# Patient Record
Sex: FEMALE | Race: WHITE | Employment: FULL TIME | URBAN - METROPOLITAN AREA
[De-identification: names, ages, dates, MRNs, and addresses within clinical notes are randomized per-mention and may not be internally consistent; named-entity substitution may affect disease eponyms.]

---

## 2017-06-19 RX ORDER — PANTOPRAZOLE SODIUM 40 MG/1
TABLET, DELAYED RELEASE ORAL
Qty: 30 TABLET | Refills: 0 | Status: SHIPPED | OUTPATIENT
Start: 2017-06-19 | End: 2017-06-22 | Stop reason: SDUPTHER

## 2017-06-19 RX ORDER — HYDROCHLOROTHIAZIDE 25 MG/1
25 TABLET ORAL DAILY
Qty: 30 TABLET | Refills: 0 | Status: SHIPPED | OUTPATIENT
Start: 2017-06-19 | End: 2017-06-22 | Stop reason: SDUPTHER

## 2017-06-22 ENCOUNTER — OFFICE VISIT (OUTPATIENT)
Dept: FAMILY MEDICINE CLINIC | Age: 42
End: 2017-06-22
Payer: COMMERCIAL

## 2017-06-22 VITALS
OXYGEN SATURATION: 98 % | WEIGHT: 148 LBS | TEMPERATURE: 98 F | SYSTOLIC BLOOD PRESSURE: 124 MMHG | BODY MASS INDEX: 26.22 KG/M2 | HEART RATE: 96 BPM | DIASTOLIC BLOOD PRESSURE: 82 MMHG | HEIGHT: 63 IN

## 2017-06-22 DIAGNOSIS — F41.9 ANXIETY: ICD-10-CM

## 2017-06-22 DIAGNOSIS — K21.9 GASTROESOPHAGEAL REFLUX DISEASE WITHOUT ESOPHAGITIS: Primary | ICD-10-CM

## 2017-06-22 DIAGNOSIS — I10 ESSENTIAL HYPERTENSION: ICD-10-CM

## 2017-06-22 LAB
CHOLESTEROL, TOTAL: 283 MG/DL
CHOLESTEROL/HDL RATIO: NORMAL
HDLC SERPL-MCNC: 48 MG/DL (ref 35–70)
LDL CHOLESTEROL CALCULATED: NORMAL MG/DL (ref 0–160)
TRIGL SERPL-MCNC: NORMAL MG/DL
VLDLC SERPL CALC-MCNC: NORMAL MG/DL

## 2017-06-22 PROCEDURE — 99214 OFFICE O/P EST MOD 30 MIN: CPT | Performed by: NURSE PRACTITIONER

## 2017-06-22 RX ORDER — ALPRAZOLAM 0.5 MG/1
0.5 TABLET ORAL 2 TIMES DAILY PRN
Qty: 28 TABLET | Refills: 0 | Status: SHIPPED | OUTPATIENT
Start: 2017-06-22 | End: 2017-07-22

## 2017-06-22 RX ORDER — HYDROCHLOROTHIAZIDE 25 MG/1
25 TABLET ORAL DAILY
Qty: 90 TABLET | Refills: 1 | Status: SHIPPED | OUTPATIENT
Start: 2017-06-22 | End: 2018-01-03 | Stop reason: SDUPTHER

## 2017-06-22 RX ORDER — PANTOPRAZOLE SODIUM 40 MG/1
TABLET, DELAYED RELEASE ORAL
Qty: 90 TABLET | Refills: 1 | Status: SHIPPED | OUTPATIENT
Start: 2017-06-22 | End: 2018-01-03 | Stop reason: SDUPTHER

## 2017-06-22 RX ORDER — SERTRALINE HYDROCHLORIDE 25 MG/1
25 TABLET, FILM COATED ORAL DAILY
Qty: 30 TABLET | Refills: 0 | Status: SHIPPED | OUTPATIENT
Start: 2017-06-22 | End: 2017-07-20 | Stop reason: SDUPTHER

## 2017-06-22 ASSESSMENT — PATIENT HEALTH QUESTIONNAIRE - PHQ9
1. LITTLE INTEREST OR PLEASURE IN DOING THINGS: 0
SUM OF ALL RESPONSES TO PHQ9 QUESTIONS 1 & 2: 0
2. FEELING DOWN, DEPRESSED OR HOPELESS: 0
SUM OF ALL RESPONSES TO PHQ QUESTIONS 1-9: 0

## 2017-06-22 ASSESSMENT — ENCOUNTER SYMPTOMS
NAUSEA: 0
SORE THROAT: 0
ABDOMINAL PAIN: 1
SHORTNESS OF BREATH: 0
HEARTBURN: 1
COUGH: 0

## 2017-07-20 ENCOUNTER — OFFICE VISIT (OUTPATIENT)
Dept: FAMILY MEDICINE CLINIC | Age: 42
End: 2017-07-20
Payer: COMMERCIAL

## 2017-07-20 VITALS
HEIGHT: 63 IN | HEART RATE: 92 BPM | WEIGHT: 141 LBS | DIASTOLIC BLOOD PRESSURE: 82 MMHG | BODY MASS INDEX: 24.98 KG/M2 | SYSTOLIC BLOOD PRESSURE: 122 MMHG | OXYGEN SATURATION: 97 % | TEMPERATURE: 98.1 F

## 2017-07-20 DIAGNOSIS — Z71.6 ENCOUNTER FOR SMOKING CESSATION COUNSELING: ICD-10-CM

## 2017-07-20 DIAGNOSIS — F41.9 ANXIETY: Primary | ICD-10-CM

## 2017-07-20 PROCEDURE — 99214 OFFICE O/P EST MOD 30 MIN: CPT | Performed by: NURSE PRACTITIONER

## 2017-07-20 RX ORDER — VARENICLINE TARTRATE 25 MG
KIT ORAL
Qty: 53 TABLET | Refills: 0 | Status: SHIPPED | OUTPATIENT
Start: 2017-07-20 | End: 2017-08-24 | Stop reason: DRUGHIGH

## 2017-07-20 ASSESSMENT — ENCOUNTER SYMPTOMS
HEARTBURN: 0
NAUSEA: 0
VOMITING: 0
ABDOMINAL PAIN: 0

## 2017-08-24 ENCOUNTER — OFFICE VISIT (OUTPATIENT)
Dept: FAMILY MEDICINE CLINIC | Age: 42
End: 2017-08-24
Payer: COMMERCIAL

## 2017-08-24 VITALS
SYSTOLIC BLOOD PRESSURE: 114 MMHG | WEIGHT: 147 LBS | OXYGEN SATURATION: 98 % | HEART RATE: 84 BPM | TEMPERATURE: 98.2 F | BODY MASS INDEX: 26.04 KG/M2 | DIASTOLIC BLOOD PRESSURE: 60 MMHG

## 2017-08-24 DIAGNOSIS — G56.01 CARPAL TUNNEL SYNDROME OF RIGHT WRIST: ICD-10-CM

## 2017-08-24 DIAGNOSIS — Z71.6 ENCOUNTER FOR SMOKING CESSATION COUNSELING: Primary | ICD-10-CM

## 2017-08-24 PROCEDURE — 99407 BEHAV CHNG SMOKING > 10 MIN: CPT | Performed by: NURSE PRACTITIONER

## 2017-08-24 RX ORDER — VARENICLINE TARTRATE 1 MG/1
1 TABLET, FILM COATED ORAL 2 TIMES DAILY
Qty: 60 TABLET | Refills: 0 | Status: SHIPPED | OUTPATIENT
Start: 2017-08-24 | End: 2018-01-03 | Stop reason: ALTCHOICE

## 2017-08-24 ASSESSMENT — ENCOUNTER SYMPTOMS
COUGH: 0
SHORTNESS OF BREATH: 0

## 2018-01-03 ENCOUNTER — OFFICE VISIT (OUTPATIENT)
Dept: FAMILY MEDICINE CLINIC | Age: 43
End: 2018-01-03
Payer: COMMERCIAL

## 2018-01-03 ENCOUNTER — HOSPITAL ENCOUNTER (OUTPATIENT)
Dept: MAMMOGRAPHY | Age: 43
Discharge: HOME | End: 2018-01-03
Payer: COMMERCIAL

## 2018-01-03 VITALS
HEART RATE: 96 BPM | WEIGHT: 157 LBS | HEIGHT: 63 IN | DIASTOLIC BLOOD PRESSURE: 78 MMHG | BODY MASS INDEX: 27.82 KG/M2 | SYSTOLIC BLOOD PRESSURE: 136 MMHG | OXYGEN SATURATION: 98 % | TEMPERATURE: 98.2 F

## 2018-01-03 DIAGNOSIS — F41.1 GAD (GENERALIZED ANXIETY DISORDER): Primary | ICD-10-CM

## 2018-01-03 DIAGNOSIS — K21.9 GASTROESOPHAGEAL REFLUX DISEASE WITHOUT ESOPHAGITIS: ICD-10-CM

## 2018-01-03 DIAGNOSIS — Z12.31 SCREENING MAMMOGRAM, ENCOUNTER FOR: ICD-10-CM

## 2018-01-03 DIAGNOSIS — I10 ESSENTIAL HYPERTENSION: ICD-10-CM

## 2018-01-03 PROCEDURE — 99214 OFFICE O/P EST MOD 30 MIN: CPT | Performed by: NURSE PRACTITIONER

## 2018-01-03 PROCEDURE — 77067 SCR MAMMO BI INCL CAD: CPT

## 2018-01-03 RX ORDER — HYDROCHLOROTHIAZIDE 25 MG/1
25 TABLET ORAL DAILY
Qty: 90 TABLET | Refills: 1 | Status: SHIPPED | OUTPATIENT
Start: 2018-01-03 | End: 2018-07-19 | Stop reason: SDUPTHER

## 2018-01-03 RX ORDER — PANTOPRAZOLE SODIUM 40 MG/1
TABLET, DELAYED RELEASE ORAL
Qty: 90 TABLET | Refills: 1 | Status: SHIPPED | OUTPATIENT
Start: 2018-01-03 | End: 2018-07-19 | Stop reason: SDUPTHER

## 2018-01-03 NOTE — PROGRESS NOTES
Pulmonary/Chest: Effort normal and breath sounds normal. No respiratory distress. Neurological: She is alert and oriented to person, place, and time. Skin: Skin is warm and dry. Psychiatric: She has a normal mood and affect. Her speech is normal and behavior is normal. Judgment normal. Cognition and memory are normal. She expresses no homicidal and no suicidal ideation. She expresses no suicidal plans and no homicidal plans. Nursing note and vitals reviewed. Data:     Lab Results   Component Value Date     07/12/2013    K 4.2 07/12/2013     07/12/2013    CO2 22 07/12/2013    BUN 11 07/12/2013    CREATININE 0.77 07/12/2013    GLUCOSE 92 07/12/2013    GLUCOSE 111 04/26/2012    PROT 7.1 07/12/2013    LABALBU 4.5 07/12/2013    LABALBU 4.2 04/19/2012    BILITOT 0.41 07/12/2013    ALKPHOS 73 07/12/2013    AST 18 07/12/2013    ALT 12 07/12/2013     Lab Results   Component Value Date    WBC 7.7 02/24/2016    RBC 4.37 02/24/2016    RBC 4.47 04/26/2012    HGB 13.5 02/24/2016    HCT 40.2 02/24/2016    MCV 91.8 02/24/2016    MCH 30.9 02/24/2016    MCHC 33.6 02/24/2016    RDW 13.5 02/24/2016     02/24/2016     04/26/2012    MPV NOT REPORTED 02/24/2016     Lab Results   Component Value Date    TSH 1.26 02/24/2016     Lab Results   Component Value Date    CHOL 283 06/22/2017    HDL 48 06/22/2017    LABA1C 5.6 02/24/2016          Assessment & Plan       1. NALINI (generalized anxiety disorder)  ---Patient being well controlled on Zoloft daily. Patient denies suicidal ideation or plan. Continue taking Zoloft as directed. 2. Essential hypertension  --Blood pressure to goal at this time on hydrochlorothiazide. Continue current regimen     3. Gastroesophageal reflux disease without esophagitis   --Patient having no symptoms of gastric reflux at this time while taking pantoprazole. Continue taking medication as directed. Patient verbalizes understanding and agreement with plan. All questions answered. If symptoms do not resolve or worsen, return to office. Face to face time > 25 minutes    Greater than 50% of time was spent counseling pt regarding disease process and medications                Completed Refills   Requested Prescriptions     Signed Prescriptions Disp Refills    hydrochlorothiazide (HYDRODIURIL) 25 MG tablet 90 tablet 1     Sig: Take 1 tablet by mouth daily    pantoprazole (PROTONIX) 40 MG tablet 90 tablet 1     Sig: Take 1 tab first thing in AM on empty stomach    sertraline (ZOLOFT) 50 MG tablet 90 tablet 1     Sig: Take 1 tablet by mouth daily     No Follow-up on file. Orders Placed This Encounter   Medications    hydrochlorothiazide (HYDRODIURIL) 25 MG tablet     Sig: Take 1 tablet by mouth daily     Dispense:  90 tablet     Refill:  1    pantoprazole (PROTONIX) 40 MG tablet     Sig: Take 1 tab first thing in AM on empty stomach     Dispense:  90 tablet     Refill:  1    sertraline (ZOLOFT) 50 MG tablet     Sig: Take 1 tablet by mouth daily     Dispense:  90 tablet     Refill:  1     No orders of the defined types were placed in this encounter. Patient Instructions     SURVEY:    You may be receiving a survey from [employer] regarding your visit today. Please complete the survey to enable us to provide the highest quality of care to you and your family. If you cannot score us a very good on any question, please call the office to discuss how we could of made your experience a very good one. Thank you.       Electronically signed by Ger Montemayor CNP on 1/4/2018 at 10:42 AM           Completed Refills   Requested Prescriptions     Signed Prescriptions Disp Refills    hydrochlorothiazide (HYDRODIURIL) 25 MG tablet 90 tablet 1     Sig: Take 1 tablet by mouth daily    pantoprazole (PROTONIX) 40 MG tablet 90 tablet 1     Sig: Take 1 tab first thing in AM on empty stomach    sertraline (ZOLOFT) 50 MG tablet 90 tablet 1     Sig: Take 1 tablet by mouth daily           Katharine received counseling on the following healthy behaviors: nutrition, exercise and medication adherence  Reviewed prior labs and health maintenance  Continue current medications, diet and exercise. Discussed use, benefit, and side effects of prescribed medications. Barriers to medication compliance addressed. Patient given educational materials - see patient instructions  Was a self-tracking handout given in paper form or via [employer]? Yes    Requested Prescriptions     Signed Prescriptions Disp Refills    hydrochlorothiazide (HYDRODIURIL) 25 MG tablet 90 tablet 1     Sig: Take 1 tablet by mouth daily    pantoprazole (PROTONIX) 40 MG tablet 90 tablet 1     Sig: Take 1 tab first thing in AM on empty stomach    sertraline (ZOLOFT) 50 MG tablet 90 tablet 1     Sig: Take 1 tablet by mouth daily       All patient questions answered. Patient voiced understanding. Quality Measures    Body mass index is 27.81 kg/m². Elevated. Weight control planned discussed Healthy diet and regular exercise. BP: 136/78 Blood pressure is normal. Treatment plan consists of Weight Reduction and Increased Physical Activity.     No results found for: LDLCALC, LDLCHOLESTEROL, LDLDIRECT (goal LDL reduction with dx if diabetes is 50% LDL reduction)      PHQ Scores 6/22/2017   PHQ2 Score 0   PHQ9 Score 0     Interpretation of Total Score Depression Severity: 1-4 = Minimal depression, 5-9 = Mild depression, 10-14 = Moderate depression, 15-19 = Moderately severe depression, 20-27 = Severe depression

## 2018-01-04 ASSESSMENT — ENCOUNTER SYMPTOMS
VOMITING: 0
NAUSEA: 0
BLOOD IN STOOL: 0
ABDOMINAL PAIN: 0
SHORTNESS OF BREATH: 0
HEARTBURN: 0
COUGH: 0
DIARRHEA: 0
BELCHING: 0

## 2018-01-16 ENCOUNTER — HOSPITAL ENCOUNTER (OUTPATIENT)
Dept: ULTRASOUND IMAGING | Age: 43
Discharge: HOME | End: 2018-01-16
Payer: COMMERCIAL

## 2018-01-16 ENCOUNTER — HOSPITAL ENCOUNTER (OUTPATIENT)
Dept: MAMMOGRAPHY | Age: 43
Discharge: HOME | End: 2018-01-16
Payer: COMMERCIAL

## 2018-01-16 DIAGNOSIS — R92.8 ABNORMAL MAMMOGRAM: ICD-10-CM

## 2018-01-16 PROCEDURE — 77065 DX MAMMO INCL CAD UNI: CPT

## 2018-01-16 PROCEDURE — 76642 ULTRASOUND BREAST LIMITED: CPT

## 2018-05-16 ENCOUNTER — OFFICE VISIT (OUTPATIENT)
Dept: FAMILY MEDICINE CLINIC | Age: 43
End: 2018-05-16
Payer: COMMERCIAL

## 2018-05-16 VITALS
HEIGHT: 63 IN | RESPIRATION RATE: 18 BRPM | SYSTOLIC BLOOD PRESSURE: 118 MMHG | HEART RATE: 68 BPM | BODY MASS INDEX: 28.88 KG/M2 | DIASTOLIC BLOOD PRESSURE: 80 MMHG | WEIGHT: 163 LBS

## 2018-05-16 DIAGNOSIS — K21.9 GASTROESOPHAGEAL REFLUX DISEASE WITHOUT ESOPHAGITIS: ICD-10-CM

## 2018-05-16 DIAGNOSIS — E66.3 OVERWEIGHT: ICD-10-CM

## 2018-05-16 DIAGNOSIS — E04.9 ENLARGED THYROID: ICD-10-CM

## 2018-05-16 DIAGNOSIS — Z87.11 HISTORY OF BLEEDING ULCERS: ICD-10-CM

## 2018-05-16 DIAGNOSIS — E78.00 HYPERCHOLESTEROLEMIA: Primary | ICD-10-CM

## 2018-05-16 PROCEDURE — 99214 OFFICE O/P EST MOD 30 MIN: CPT | Performed by: NURSE PRACTITIONER

## 2018-05-16 ASSESSMENT — ENCOUNTER SYMPTOMS
EYES NEGATIVE: 1
COUGH: 0
ABDOMINAL DISTENTION: 0
SINUS PAIN: 0

## 2018-05-17 LAB
ALT SERPL-CCNC: 11 U/L
AST SERPL-CCNC: 14 U/L
BASOPHILS ABSOLUTE: ABNORMAL /ΜL
BASOPHILS RELATIVE PERCENT: ABNORMAL %
BUN BLDV-MCNC: 12 MG/DL
CALCIUM SERPL-MCNC: 10.1 MG/DL
CHLORIDE BLD-SCNC: 99 MMOL/L
CHOLESTEROL, TOTAL: 297 MG/DL
CHOLESTEROL/HDL RATIO: 6.6
CO2: 26 MMOL/L
CREAT SERPL-MCNC: 0.79 MG/DL
EOSINOPHILS ABSOLUTE: ABNORMAL /ΜL
EOSINOPHILS RELATIVE PERCENT: ABNORMAL %
GFR CALCULATED: NORMAL
GLUCOSE BLD-MCNC: 99 MG/DL
HCT VFR BLD CALC: 35.6 % (ref 36–46)
HDLC SERPL-MCNC: 45 MG/DL (ref 35–70)
HEMOGLOBIN: 11.9 G/DL (ref 12–16)
LDL CHOLESTEROL CALCULATED: 205 MG/DL (ref 0–160)
LYMPHOCYTES ABSOLUTE: ABNORMAL /ΜL
LYMPHOCYTES RELATIVE PERCENT: ABNORMAL %
MCH RBC QN AUTO: 29.4 PG
MCHC RBC AUTO-ENTMCNC: 33.4 G/DL
MCV RBC AUTO: 88 FL
MONOCYTES ABSOLUTE: ABNORMAL /ΜL
MONOCYTES RELATIVE PERCENT: ABNORMAL %
NEUTROPHILS ABSOLUTE: ABNORMAL /ΜL
NEUTROPHILS RELATIVE PERCENT: ABNORMAL %
PLATELET # BLD: 545 K/ΜL
PMV BLD AUTO: ABNORMAL FL
POTASSIUM SERPL-SCNC: 4.3 MMOL/L
RBC # BLD: 4.05 10^6/ΜL
SODIUM BLD-SCNC: 138 MMOL/L
TRIGL SERPL-MCNC: 236 MG/DL
TSH SERPL DL<=0.05 MIU/L-ACNC: 2.56 UIU/ML
VLDLC SERPL CALC-MCNC: ABNORMAL MG/DL
WBC # BLD: 7.7 10^3/ML

## 2018-05-18 ENCOUNTER — TELEPHONE (OUTPATIENT)
Dept: FAMILY MEDICINE CLINIC | Age: 43
End: 2018-05-18

## 2018-06-04 ENCOUNTER — TELEPHONE (OUTPATIENT)
Dept: FAMILY MEDICINE CLINIC | Age: 43
End: 2018-06-04

## 2018-06-04 DIAGNOSIS — Z87.11 HISTORY OF BLEEDING ULCERS: ICD-10-CM

## 2018-06-04 DIAGNOSIS — D50.9 IRON DEFICIENCY ANEMIA, UNSPECIFIED IRON DEFICIENCY ANEMIA TYPE: ICD-10-CM

## 2018-06-04 DIAGNOSIS — K21.9 GASTROESOPHAGEAL REFLUX DISEASE WITHOUT ESOPHAGITIS: Primary | ICD-10-CM

## 2018-06-06 ENCOUNTER — HOSPITAL ENCOUNTER (OUTPATIENT)
Age: 43
Discharge: HOME | End: 2018-06-06
Payer: COMMERCIAL

## 2018-06-06 DIAGNOSIS — K21.9 GASTROESOPHAGEAL REFLUX DISEASE WITHOUT ESOPHAGITIS: ICD-10-CM

## 2018-06-06 DIAGNOSIS — D50.9 IRON DEFICIENCY ANEMIA, UNSPECIFIED IRON DEFICIENCY ANEMIA TYPE: ICD-10-CM

## 2018-06-06 DIAGNOSIS — Z87.11 HISTORY OF BLEEDING ULCERS: ICD-10-CM

## 2018-06-06 LAB
ABSOLUTE EOS #: 0.1 K/UL (ref 0–0.4)
ABSOLUTE IMMATURE GRANULOCYTE: ABNORMAL K/UL (ref 0–0.3)
ABSOLUTE LYMPH #: 2.5 K/UL (ref 1–4.8)
ABSOLUTE MONO #: 0.4 K/UL (ref 0–1)
BASOPHILS # BLD: 1 % (ref 0–2)
BASOPHILS ABSOLUTE: 0.1 K/UL (ref 0–0.2)
DIFFERENTIAL TYPE: YES
EOSINOPHILS RELATIVE PERCENT: 1 % (ref 0–5)
FERRITIN: 5 UG/L (ref 13–150)
FOLATE: 13 NG/ML
HCT VFR BLD CALC: 31.6 % (ref 36–46)
HEMOGLOBIN: 10.6 G/DL (ref 12–16)
IMMATURE GRANULOCYTES: ABNORMAL %
IRON SATURATION: 7 % (ref 20–55)
IRON: 27 UG/DL (ref 37–145)
LYMPHOCYTES # BLD: 28 % (ref 15–40)
MCH RBC QN AUTO: 29 PG (ref 26–34)
MCHC RBC AUTO-ENTMCNC: 33.5 G/DL (ref 31–37)
MCV RBC AUTO: 86.7 FL (ref 80–100)
MONOCYTES # BLD: 5 % (ref 4–8)
NRBC AUTOMATED: ABNORMAL PER 100 WBC
PDW BLD-RTO: 14.9 % (ref 12.1–15.2)
PLATELET # BLD: 490 K/UL (ref 140–450)
PLATELET ESTIMATE: ABNORMAL
PMV BLD AUTO: ABNORMAL FL (ref 6–12)
RBC # BLD: 3.65 M/UL (ref 4–5.2)
RBC # BLD: ABNORMAL 10*6/UL
SEG NEUTROPHILS: 65 % (ref 47–75)
SEGMENTED NEUTROPHILS ABSOLUTE COUNT: 5.8 K/UL (ref 2.5–7)
TOTAL IRON BINDING CAPACITY: 383 UG/DL (ref 250–450)
UNSATURATED IRON BINDING CAPACITY: 356 UG/DL (ref 112–347)
VITAMIN B-12: 266 PG/ML (ref 232–1245)
WBC # BLD: 8.9 K/UL (ref 3.5–11)
WBC # BLD: ABNORMAL 10*3/UL

## 2018-06-06 PROCEDURE — 82607 VITAMIN B-12: CPT

## 2018-06-06 PROCEDURE — 83540 ASSAY OF IRON: CPT

## 2018-06-06 PROCEDURE — 82728 ASSAY OF FERRITIN: CPT

## 2018-06-06 PROCEDURE — 36415 COLL VENOUS BLD VENIPUNCTURE: CPT

## 2018-06-06 PROCEDURE — 82746 ASSAY OF FOLIC ACID SERUM: CPT

## 2018-06-06 PROCEDURE — 83550 IRON BINDING TEST: CPT

## 2018-06-06 PROCEDURE — 85025 COMPLETE CBC W/AUTO DIFF WBC: CPT

## 2018-06-07 RX ORDER — NORETHINDRONE ACETATE AND ETHINYL ESTRADIOL, ETHINYL ESTRADIOL AND FERROUS FUMARATE 1MG-10(24)
KIT ORAL
Refills: 4 | COMMUNITY
Start: 2018-05-31 | End: 2018-06-26

## 2018-06-26 ENCOUNTER — OFFICE VISIT (OUTPATIENT)
Dept: SURGERY | Age: 43
End: 2018-06-26
Payer: COMMERCIAL

## 2018-06-26 VITALS
HEART RATE: 88 BPM | WEIGHT: 161 LBS | HEIGHT: 63 IN | SYSTOLIC BLOOD PRESSURE: 123 MMHG | DIASTOLIC BLOOD PRESSURE: 81 MMHG | RESPIRATION RATE: 16 BRPM | BODY MASS INDEX: 28.53 KG/M2

## 2018-06-26 DIAGNOSIS — R10.13 EPIGASTRIC PAIN: Primary | ICD-10-CM

## 2018-06-26 DIAGNOSIS — Z87.11 HISTORY OF GASTRIC ULCER: ICD-10-CM

## 2018-06-26 PROBLEM — R92.8 ABNORMAL FINDING ON BREAST IMAGING: Status: ACTIVE | Noted: 2018-01-16

## 2018-06-26 PROCEDURE — 99203 OFFICE O/P NEW LOW 30 MIN: CPT | Performed by: SURGERY

## 2018-06-26 RX ORDER — LANOLIN ALCOHOL/MO/W.PET/CERES
1000 CREAM (GRAM) TOPICAL DAILY
COMMUNITY
End: 2020-01-14 | Stop reason: ALTCHOICE

## 2018-07-15 ASSESSMENT — ENCOUNTER SYMPTOMS
ABDOMINAL PAIN: 1
SHORTNESS OF BREATH: 0
BLOOD IN STOOL: 0
TROUBLE SWALLOWING: 0
SORE THROAT: 0
VOMITING: 0
COUGH: 0
CHOKING: 0
NAUSEA: 1
BACK PAIN: 0

## 2018-07-16 NOTE — PROGRESS NOTES
Subjective:      Patient ID: Laxmi Lai is a 37 y.o. female. HPI     Ms Andrzej Doss is a very pleasant 38 yo WF kindly referred to me by Elmer Birmingham for epigastric pain with reflux symptoms. These symptoms have been present for many years. EGD 2012 showing stomach ulceration. H/H June 11/32, down from 14/40 in February. No recent weight changes. Decreased appetite, pain less after eating most occasions. No previous colonoscopy. No family history of colon cancer, but mother with colon polyps. Quit tobacco use august 2017. Review of Systems   Constitutional: Negative for activity change, appetite change, chills, fever and unexpected weight change. HENT: Negative for nosebleeds, sneezing, sore throat and trouble swallowing. Eyes: Negative for visual disturbance. Respiratory: Negative for cough, choking and shortness of breath. Cardiovascular: Negative for chest pain, palpitations and leg swelling. Gastrointestinal: Positive for abdominal pain (epigastric) and nausea. Negative for blood in stool and vomiting. Genitourinary: Negative for dysuria, flank pain and hematuria. Musculoskeletal: Positive for arthralgias. Negative for back pain, gait problem and myalgias. Allergic/Immunologic: Negative for immunocompromised state. Neurological: Positive for headaches. Negative for dizziness, seizures, syncope and weakness. Hematological: Does not bruise/bleed easily. Psychiatric/Behavioral: Negative for confusion and sleep disturbance.         Past Medical History:   Diagnosis Date    Anxiety     Asthma     Bronchitis     Chronic back pain     Depression     Diverticulosis     Eczema     Fractures     history of broken toe    GERD (gastroesophageal reflux disease)     Hemorrhoids     Hiatal hernia     Hiatal hernia     History of bleeding ulcers     Hives     Migraine        Past Surgical History:   Procedure Laterality Date    COLONOSCOPY  2012    Dr. Amanda Gillis

## 2018-07-17 ENCOUNTER — HOSPITAL ENCOUNTER (OUTPATIENT)
Age: 43
Setting detail: SPECIMEN
Discharge: HOME | End: 2018-07-17
Payer: COMMERCIAL

## 2018-07-17 ENCOUNTER — OFFICE VISIT (OUTPATIENT)
Dept: OBGYN CLINIC | Age: 43
End: 2018-07-17
Payer: COMMERCIAL

## 2018-07-17 VITALS
HEIGHT: 63 IN | BODY MASS INDEX: 28.53 KG/M2 | HEART RATE: 90 BPM | DIASTOLIC BLOOD PRESSURE: 82 MMHG | WEIGHT: 161 LBS | SYSTOLIC BLOOD PRESSURE: 138 MMHG | RESPIRATION RATE: 16 BRPM

## 2018-07-17 DIAGNOSIS — N93.8 DUB (DYSFUNCTIONAL UTERINE BLEEDING): Primary | ICD-10-CM

## 2018-07-17 DIAGNOSIS — Z01.818 PRE-OP TESTING: ICD-10-CM

## 2018-07-17 PROCEDURE — 88305 TISSUE EXAM BY PATHOLOGIST: CPT

## 2018-07-17 PROCEDURE — 58100 BIOPSY OF UTERUS LINING: CPT | Performed by: OBSTETRICS & GYNECOLOGY

## 2018-07-17 RX ORDER — NORETHINDRONE ACETATE AND ETHINYL ESTRADIOL 1MG-20(21)
1 KIT ORAL DAILY
Status: ON HOLD | COMMUNITY
End: 2018-08-14 | Stop reason: HOSPADM

## 2018-07-17 NOTE — PROGRESS NOTES
PROBLEM VISIT     Date of service: 2018    Junior Aguirre  Is a 37 y.o.  female    PT's PCP is: IRVING Lance CNP     : 1975                                             Subjective:       No LMP recorded. OB History   No data available        History   Smoking Status    Former Smoker    Packs/day: 1.00    Years: 28.00    Types: Cigarettes    Quit date: 2017   Smokeless Tobacco    Never Used     Comment: chantix used to quit smoking        History   Alcohol Use    0.0 oz/week     Comment: moderate       Allergies: Seasonal      Current Outpatient Prescriptions:     Multiple Vitamins-Calcium (ONE-A-DAY WOMENS PO), Take by mouth, Disp: , Rfl:     vitamin B-12 (CYANOCOBALAMIN) 1000 MCG tablet, Take 1,000 mcg by mouth daily, Disp: , Rfl:     hydrochlorothiazide (HYDRODIURIL) 25 MG tablet, Take 1 tablet by mouth daily, Disp: 90 tablet, Rfl: 1    pantoprazole (PROTONIX) 40 MG tablet, Take 1 tab first thing in AM on empty stomach, Disp: 90 tablet, Rfl: 1    sertraline (ZOLOFT) 50 MG tablet, Take 1 tablet by mouth daily, Disp: 90 tablet, Rfl: 1    ibuprofen (ADVIL;MOTRIN) 600 MG tablet, Take 600 mg by mouth every 6 hours as needed for Pain., Disp: , Rfl:     History   Sexual Activity    Sexual activity: Not on file       Last Yearly:      Last pap: 2017-[facility] [place]    Last HPV:     Chief Complaint   Patient presents with    New Patient     wants to discuss ablation-        PE:  Vital Signs  Resp. rate 16, weight 161 lb (73 kg). Labs:    No results found for this visit on 18. NURSE: Milagro TAM    HPI: The patient is here today requesting to have an endometrial ablation for control of her irregular bleeding. She states that her cycles are very irregular and she can have bleeding up to a month's time. No PT denies fever, chills, nausea and vomiting       Objective:  The patient states she had a normal Pap smear in November and has had an ultrasound done as well.  Lymphatic:   Negative  Heent:   negative   Cor: regular rate and rhythm, no murmurs              Pul:clear to auscultation bilaterally- no wheezes, rales or rhonchi, normal air movement, no respiratory distress      GI: Abdomen soft, non-tender. BS normal. No masses,  No organomegaly           Extremities: normal strength, tone, and muscle mass   Breasts: Breast: Not examined   Pelvic Exam: GENITAL/URINARY:  External Genitalia:  General appearance; normal, Hair distribution; normal, Lesions absent  Vagina:  General appearance normal, Estrogen effect normal, Discharge absent, Lesions absent, Pelvic support normal  Cervix:  General appearance normal, Lesions absent, Discharge absent, Tenderness absent, Enlargement absent, Nodularity absent  Uterus:  Size normal, Contour normal, Position normal, Masses absent, Consistency; normal, Support normal, Tenderness absent  Adenexa: Masses absent, Tenderness absent, Enlargement absent, Nodularity absent                                    Vaginal discharge: no vaginal discharge     Assessment/plan: We will need to obtain ultrasound results. An endometrial biopsy was performed today. The cervix was well visualized thoroughly prepped with Betadine and a Pipelle was inserted to a depth of 8 cm. A moderate amount of endometrial tissue noted in the Pipelle was well. The patient did tolerate the procedure well. I also did review with surgical consent form with her pending above results. I did talk about the surgical risk of blood loss infection both being small a very small risk of uterine perforation with risk of additional surgery if this were to occur.   I did tell her there is also a small failure rate associated with the NovaSure ablation                         Estimated time of visit 20 minutes

## 2018-07-19 ENCOUNTER — OFFICE VISIT (OUTPATIENT)
Dept: FAMILY MEDICINE CLINIC | Age: 43
End: 2018-07-19
Payer: COMMERCIAL

## 2018-07-19 VITALS
SYSTOLIC BLOOD PRESSURE: 118 MMHG | HEART RATE: 94 BPM | WEIGHT: 161 LBS | BODY MASS INDEX: 28.52 KG/M2 | OXYGEN SATURATION: 98 % | DIASTOLIC BLOOD PRESSURE: 70 MMHG | TEMPERATURE: 98.1 F

## 2018-07-19 DIAGNOSIS — I10 ESSENTIAL HYPERTENSION: ICD-10-CM

## 2018-07-19 DIAGNOSIS — K21.9 GASTROESOPHAGEAL REFLUX DISEASE WITHOUT ESOPHAGITIS: Primary | ICD-10-CM

## 2018-07-19 DIAGNOSIS — F41.1 GAD (GENERALIZED ANXIETY DISORDER): ICD-10-CM

## 2018-07-19 LAB — SURGICAL PATHOLOGY REPORT: NORMAL

## 2018-07-19 PROCEDURE — 99214 OFFICE O/P EST MOD 30 MIN: CPT | Performed by: NURSE PRACTITIONER

## 2018-07-19 RX ORDER — PANTOPRAZOLE SODIUM 40 MG/1
TABLET, DELAYED RELEASE ORAL
Qty: 90 TABLET | Refills: 1 | Status: SHIPPED | OUTPATIENT
Start: 2018-07-19 | End: 2018-12-27 | Stop reason: SDUPTHER

## 2018-07-19 RX ORDER — HYDROCHLOROTHIAZIDE 25 MG/1
25 TABLET ORAL DAILY
Qty: 90 TABLET | Refills: 1 | Status: SHIPPED | OUTPATIENT
Start: 2018-07-19 | End: 2018-12-27 | Stop reason: SDUPTHER

## 2018-07-19 ASSESSMENT — ENCOUNTER SYMPTOMS
NAUSEA: 0
BLURRED VISION: 0
VOMITING: 0
COUGH: 0
HOARSE VOICE: 0
DOUBLE VISION: 0
ORTHOPNEA: 0
SORE THROAT: 0
CONSTIPATION: 0
BLOOD IN STOOL: 0
DIARRHEA: 0
BACK PAIN: 0
HEARTBURN: 0
ABDOMINAL PAIN: 0
SHORTNESS OF BREATH: 0

## 2018-07-19 ASSESSMENT — PATIENT HEALTH QUESTIONNAIRE - PHQ9
1. LITTLE INTEREST OR PLEASURE IN DOING THINGS: 0
SUM OF ALL RESPONSES TO PHQ QUESTIONS 1-9: 0
SUM OF ALL RESPONSES TO PHQ9 QUESTIONS 1 & 2: 0
2. FEELING DOWN, DEPRESSED OR HOPELESS: 0

## 2018-07-19 NOTE — PATIENT INSTRUCTIONS
SURVEY:    You may be receiving a survey from [employer] regarding your visit today. Please complete the survey to enable us to provide the highest quality of care to you and your family. If you cannot score us a very good on any question, please call the office to discuss how we could have made your experience a very good one. Thank you.

## 2018-07-19 NOTE — PROGRESS NOTES
Relation Age of Onset    Cancer Maternal Grandfather         Bladder Cancer    Cancer Paternal Grandfather         Lung Cancer       Review of Systems:         Review of Systems   Constitutional: Negative for chills, fatigue, fever and malaise/fatigue. HENT: Negative for hoarse voice and sore throat. Eyes: Negative for blurred vision and double vision. Respiratory: Negative for cough and shortness of breath. Cardiovascular: Negative for chest pain, palpitations, orthopnea and leg swelling. Gastrointestinal: Negative for abdominal pain, blood in stool, constipation, diarrhea, heartburn, nausea and vomiting. Genitourinary: Negative for dysuria, frequency and hematuria. Musculoskeletal: Negative for back pain, falls, joint pain and neck pain. Skin: Negative for itching and rash. Neurological: Negative for dizziness, tremors, seizures, loss of consciousness, weakness and headaches. Endo/Heme/Allergies: Does not bruise/bleed easily. Psychiatric/Behavioral: Positive for dysphoric mood. Negative for depression and suicidal ideas. The patient is not nervous/anxious. Physical Exam:     Vitals:  /70   Pulse 94   Temp 98.1 °F (36.7 °C) (Oral)   Wt 161 lb (73 kg)   LMP 06/11/2018   SpO2 98%   BMI 28.52 kg/m²       Physical Exam   Constitutional: She is oriented to person, place, and time. Vital signs are normal. She appears well-developed and well-nourished. HENT:   Head: Normocephalic. Right Ear: Hearing, tympanic membrane and external ear normal.   Left Ear: Hearing, tympanic membrane and external ear normal.   Nose: Nose normal.   Mouth/Throat: Uvula is midline, oropharynx is clear and moist and mucous membranes are normal.   Eyes: Conjunctivae and EOM are normal. Pupils are equal, round, and reactive to light. Neck: Trachea normal and normal range of motion. Carotid bruit is not present. No thyroid mass present.    Cardiovascular: Normal rate, regular rhythm, S1 normal, S2 normal, normal heart sounds and intact distal pulses. Pulses:       Dorsalis pedis pulses are 2+ on the right side, and 2+ on the left side. Pulmonary/Chest: Effort normal and breath sounds normal.   Abdominal: Soft. Normal appearance. There is no tenderness. Musculoskeletal: Normal range of motion. Neurological: She is alert and oriented to person, place, and time. She has normal strength and normal reflexes. GCS eye subscore is 4. GCS verbal subscore is 5. GCS motor subscore is 6. Skin: Skin is warm, dry and intact. No rash noted. Psychiatric: She has a normal mood and affect. Her speech is normal and behavior is normal. Judgment and thought content normal. Cognition and memory are normal.   Nursing note and vitals reviewed. Data:     Lab Results   Component Value Date     05/17/2018    K 4.3 05/17/2018    CL 99 05/17/2018    CO2 26 05/17/2018    BUN 12 05/17/2018    CREATININE 0.79 05/17/2018    GLUCOSE 99 05/17/2018    GLUCOSE 111 04/26/2012    PROT 7.1 07/12/2013    LABALBU 4.5 07/12/2013    LABALBU 4.2 04/19/2012    BILITOT 0.41 07/12/2013    ALKPHOS 73 07/12/2013    AST 14 05/17/2018    ALT 11 05/17/2018     Lab Results   Component Value Date    WBC 8.9 06/06/2018    RBC 3.65 06/06/2018    RBC 4.47 04/26/2012    HGB 10.6 06/06/2018    HCT 31.6 06/06/2018    MCV 86.7 06/06/2018    MCH 29.0 06/06/2018    MCHC 33.5 06/06/2018    RDW 14.9 06/06/2018     06/06/2018     04/26/2012    MPV NOT REPORTED 06/06/2018     Lab Results   Component Value Date    TSH 2.56 05/17/2018     Lab Results   Component Value Date    CHOL 297 05/17/2018    HDL 45 05/17/2018    LABA1C 5.6 02/24/2016          Assessment & Plan        Diagnosis Orders   1. Gastroesophageal reflux disease without esophagitis   --Symptoms well-controlled at this time. Continue with Protonix. Patient is scheduled to have an EGD with Dr. Dr Alma Sparks     2.  Essential hypertension   --BP well-controlled at this time.  Continue hydrochlorothiazide. 3. NALINI (generalized anxiety disorder)   --well controlled on zoloft. Patient denies suicidal ideation at this time, however if patient does become suicidal, stop medication and call 911. Patient verbalizes understanding and agreement with plan. All questions answered. If symptoms do not resolve or worsen, return to office. Completed Refills   Requested Prescriptions     Signed Prescriptions Disp Refills    sertraline (ZOLOFT) 50 MG tablet 90 tablet 1     Sig: Take 1 tablet by mouth daily    pantoprazole (PROTONIX) 40 MG tablet 90 tablet 1     Sig: Take 1 tab first thing in AM on empty stomach    hydrochlorothiazide (HYDRODIURIL) 25 MG tablet 90 tablet 1     Sig: Take 1 tablet by mouth daily     No Follow-up on file. Orders Placed This Encounter   Medications    sertraline (ZOLOFT) 50 MG tablet     Sig: Take 1 tablet by mouth daily     Dispense:  90 tablet     Refill:  1    pantoprazole (PROTONIX) 40 MG tablet     Sig: Take 1 tab first thing in AM on empty stomach     Dispense:  90 tablet     Refill:  1    hydrochlorothiazide (HYDRODIURIL) 25 MG tablet     Sig: Take 1 tablet by mouth daily     Dispense:  90 tablet     Refill:  1     No orders of the defined types were placed in this encounter. Patient Instructions     SURVEY:    You may be receiving a survey from [employer] regarding your visit today. Please complete the survey to enable us to provide the highest quality of care to you and your family. If you cannot score us a very good on any question, please call the office to discuss how we could have made your experience a very good one. Thank you.       Electronically signed by IRVING Mukherjee CNP on 7/19/2018 at 4:07 PM           Completed Refills   Requested Prescriptions     Signed Prescriptions Disp Refills    sertraline (ZOLOFT) 50 MG tablet 90 tablet 1     Sig: Take 1 tablet by mouth daily   

## 2018-08-09 ENCOUNTER — HOSPITAL ENCOUNTER (OUTPATIENT)
Age: 43
Discharge: HOME | End: 2018-08-09
Payer: COMMERCIAL

## 2018-08-09 DIAGNOSIS — N93.8 DUB (DYSFUNCTIONAL UTERINE BLEEDING): ICD-10-CM

## 2018-08-09 DIAGNOSIS — Z01.818 PRE-OP TESTING: ICD-10-CM

## 2018-08-09 LAB
ABSOLUTE EOS #: 0.2 K/UL (ref 0–0.4)
ABSOLUTE IMMATURE GRANULOCYTE: NORMAL K/UL (ref 0–0.3)
ABSOLUTE LYMPH #: 2.7 K/UL (ref 1–4.8)
ABSOLUTE MONO #: 0.5 K/UL (ref 0–1)
ALBUMIN SERPL-MCNC: 4.4 G/DL (ref 3.5–5.2)
ALBUMIN/GLOBULIN RATIO: ABNORMAL (ref 1–2.5)
ALP BLD-CCNC: 102 U/L (ref 35–104)
ALT SERPL-CCNC: 20 U/L (ref 5–33)
ANION GAP SERPL CALCULATED.3IONS-SCNC: 12 MMOL/L (ref 9–17)
AST SERPL-CCNC: 18 U/L
BASOPHILS # BLD: 1 % (ref 0–2)
BASOPHILS ABSOLUTE: 0.1 K/UL (ref 0–0.2)
BILIRUB SERPL-MCNC: <0.1 MG/DL (ref 0.3–1.2)
BUN BLDV-MCNC: 13 MG/DL (ref 6–20)
BUN/CREAT BLD: 17 (ref 9–20)
CALCIUM SERPL-MCNC: 10.3 MG/DL (ref 8.6–10.4)
CHLORIDE BLD-SCNC: 102 MMOL/L (ref 98–107)
CO2: 25 MMOL/L (ref 20–31)
CREAT SERPL-MCNC: 0.75 MG/DL (ref 0.5–0.9)
DIFFERENTIAL TYPE: YES
EOSINOPHILS RELATIVE PERCENT: 2 % (ref 0–5)
GFR AFRICAN AMERICAN: >60 ML/MIN
GFR NON-AFRICAN AMERICAN: >60 ML/MIN
GFR SERPL CREATININE-BSD FRML MDRD: ABNORMAL ML/MIN/{1.73_M2}
GFR SERPL CREATININE-BSD FRML MDRD: ABNORMAL ML/MIN/{1.73_M2}
GLUCOSE BLD-MCNC: 117 MG/DL (ref 70–99)
HCT VFR BLD CALC: 38.2 % (ref 36–46)
HEMOGLOBIN: 12.7 G/DL (ref 12–16)
IMMATURE GRANULOCYTES: NORMAL %
LYMPHOCYTES # BLD: 35 % (ref 15–40)
MCH RBC QN AUTO: 29.1 PG (ref 26–34)
MCHC RBC AUTO-ENTMCNC: 33.2 G/DL (ref 31–37)
MCV RBC AUTO: 87.8 FL (ref 80–100)
MONOCYTES # BLD: 7 % (ref 4–8)
NRBC AUTOMATED: NORMAL PER 100 WBC
PDW BLD-RTO: 15.2 % (ref 12.1–15.2)
PLATELET # BLD: 397 K/UL (ref 140–450)
PLATELET ESTIMATE: NORMAL
PMV BLD AUTO: NORMAL FL (ref 6–12)
POTASSIUM SERPL-SCNC: 3.9 MMOL/L (ref 3.7–5.3)
RBC # BLD: 4.35 M/UL (ref 4–5.2)
RBC # BLD: NORMAL 10*6/UL
SEG NEUTROPHILS: 55 % (ref 47–75)
SEGMENTED NEUTROPHILS ABSOLUTE COUNT: 4.3 K/UL (ref 2.5–7)
SODIUM BLD-SCNC: 139 MMOL/L (ref 135–144)
TOTAL PROTEIN: 6.8 G/DL (ref 6.4–8.3)
WBC # BLD: 7.7 K/UL (ref 3.5–11)
WBC # BLD: NORMAL 10*3/UL

## 2018-08-09 PROCEDURE — 85025 COMPLETE CBC W/AUTO DIFF WBC: CPT

## 2018-08-09 PROCEDURE — 36415 COLL VENOUS BLD VENIPUNCTURE: CPT

## 2018-08-09 PROCEDURE — 80053 COMPREHEN METABOLIC PANEL: CPT

## 2018-08-12 ENCOUNTER — ANESTHESIA EVENT (OUTPATIENT)
Dept: OPERATING ROOM | Age: 43
End: 2018-08-12
Payer: COMMERCIAL

## 2018-08-14 ENCOUNTER — HOSPITAL ENCOUNTER (OUTPATIENT)
Age: 43
Setting detail: OUTPATIENT SURGERY
Discharge: HOME | End: 2018-08-14
Attending: OBSTETRICS & GYNECOLOGY | Admitting: OBSTETRICS & GYNECOLOGY
Payer: COMMERCIAL

## 2018-08-14 ENCOUNTER — ANESTHESIA (OUTPATIENT)
Dept: OPERATING ROOM | Age: 43
End: 2018-08-14
Payer: COMMERCIAL

## 2018-08-14 VITALS
RESPIRATION RATE: 12 BRPM | OXYGEN SATURATION: 100 % | SYSTOLIC BLOOD PRESSURE: 95 MMHG | TEMPERATURE: 98.6 F | DIASTOLIC BLOOD PRESSURE: 57 MMHG

## 2018-08-14 VITALS
RESPIRATION RATE: 18 BRPM | SYSTOLIC BLOOD PRESSURE: 142 MMHG | WEIGHT: 165 LBS | DIASTOLIC BLOOD PRESSURE: 88 MMHG | HEART RATE: 66 BPM | TEMPERATURE: 96 F | BODY MASS INDEX: 30.36 KG/M2 | HEIGHT: 62 IN | OXYGEN SATURATION: 94 %

## 2018-08-14 DIAGNOSIS — Z98.890 S/P ENDOMETRIAL ABLATION: Primary | ICD-10-CM

## 2018-08-14 LAB
ABO/RH: NORMAL
ANTIBODY SCREEN: NEGATIVE
ARM BAND NUMBER: NORMAL
EXPIRATION DATE: NORMAL
HCG QUALITATIVE: NEGATIVE

## 2018-08-14 PROCEDURE — 58563 HYSTEROSCOPY ABLATION: CPT | Performed by: OBSTETRICS & GYNECOLOGY

## 2018-08-14 PROCEDURE — 2709999900 HC NON-CHARGEABLE SUPPLY: Performed by: OBSTETRICS & GYNECOLOGY

## 2018-08-14 PROCEDURE — 3600000004 HC SURGERY LEVEL 4 BASE: Performed by: OBSTETRICS & GYNECOLOGY

## 2018-08-14 PROCEDURE — 6360000002 HC RX W HCPCS: Performed by: OBSTETRICS & GYNECOLOGY

## 2018-08-14 PROCEDURE — 3600000014 HC SURGERY LEVEL 4 ADDTL 15MIN: Performed by: OBSTETRICS & GYNECOLOGY

## 2018-08-14 PROCEDURE — 2500000003 HC RX 250 WO HCPCS: Performed by: NURSE ANESTHETIST, CERTIFIED REGISTERED

## 2018-08-14 PROCEDURE — 3700000001 HC ADD 15 MINUTES (ANESTHESIA): Performed by: OBSTETRICS & GYNECOLOGY

## 2018-08-14 PROCEDURE — 2720000010 HC SURG SUPPLY STERILE: Performed by: OBSTETRICS & GYNECOLOGY

## 2018-08-14 PROCEDURE — 7100000010 HC PHASE II RECOVERY - FIRST 15 MIN: Performed by: OBSTETRICS & GYNECOLOGY

## 2018-08-14 PROCEDURE — 36415 COLL VENOUS BLD VENIPUNCTURE: CPT

## 2018-08-14 PROCEDURE — 3700000000 HC ANESTHESIA ATTENDED CARE: Performed by: OBSTETRICS & GYNECOLOGY

## 2018-08-14 PROCEDURE — 86900 BLOOD TYPING SEROLOGIC ABO: CPT

## 2018-08-14 PROCEDURE — 84703 CHORIONIC GONADOTROPIN ASSAY: CPT

## 2018-08-14 PROCEDURE — 88305 TISSUE EXAM BY PATHOLOGIST: CPT

## 2018-08-14 PROCEDURE — 86850 RBC ANTIBODY SCREEN: CPT

## 2018-08-14 PROCEDURE — 2580000003 HC RX 258: Performed by: OBSTETRICS & GYNECOLOGY

## 2018-08-14 PROCEDURE — 6360000002 HC RX W HCPCS: Performed by: NURSE ANESTHETIST, CERTIFIED REGISTERED

## 2018-08-14 PROCEDURE — 86901 BLOOD TYPING SEROLOGIC RH(D): CPT

## 2018-08-14 PROCEDURE — 7100000011 HC PHASE II RECOVERY - ADDTL 15 MIN: Performed by: OBSTETRICS & GYNECOLOGY

## 2018-08-14 RX ORDER — HYDROCODONE BITARTRATE AND ACETAMINOPHEN 5; 325 MG/1; MG/1
1 TABLET ORAL EVERY 4 HOURS PRN
Status: CANCELLED | OUTPATIENT
Start: 2018-08-14

## 2018-08-14 RX ORDER — HYDROCODONE BITARTRATE AND ACETAMINOPHEN 5; 325 MG/1; MG/1
2 TABLET ORAL EVERY 4 HOURS PRN
Status: CANCELLED | OUTPATIENT
Start: 2018-08-14

## 2018-08-14 RX ORDER — SODIUM CHLORIDE 0.9 % (FLUSH) 0.9 %
10 SYRINGE (ML) INJECTION EVERY 12 HOURS SCHEDULED
Status: DISCONTINUED | OUTPATIENT
Start: 2018-08-14 | End: 2018-08-14 | Stop reason: HOSPADM

## 2018-08-14 RX ORDER — FENTANYL CITRATE 50 UG/ML
INJECTION, SOLUTION INTRAMUSCULAR; INTRAVENOUS PRN
Status: DISCONTINUED | OUTPATIENT
Start: 2018-08-14 | End: 2018-08-14 | Stop reason: SDUPTHER

## 2018-08-14 RX ORDER — KETOROLAC TROMETHAMINE 30 MG/ML
INJECTION, SOLUTION INTRAMUSCULAR; INTRAVENOUS PRN
Status: DISCONTINUED | OUTPATIENT
Start: 2018-08-14 | End: 2018-08-14 | Stop reason: SDUPTHER

## 2018-08-14 RX ORDER — DEXAMETHASONE SODIUM PHOSPHATE 10 MG/ML
INJECTION INTRAMUSCULAR; INTRAVENOUS PRN
Status: DISCONTINUED | OUTPATIENT
Start: 2018-08-14 | End: 2018-08-14 | Stop reason: SDUPTHER

## 2018-08-14 RX ORDER — SODIUM CHLORIDE 0.9 % (FLUSH) 0.9 %
10 SYRINGE (ML) INJECTION EVERY 12 HOURS SCHEDULED
Status: CANCELLED | OUTPATIENT
Start: 2018-08-14

## 2018-08-14 RX ORDER — PROPOFOL 10 MG/ML
INJECTION, EMULSION INTRAVENOUS PRN
Status: DISCONTINUED | OUTPATIENT
Start: 2018-08-14 | End: 2018-08-14 | Stop reason: SDUPTHER

## 2018-08-14 RX ORDER — LIDOCAINE HYDROCHLORIDE 10 MG/ML
INJECTION, SOLUTION EPIDURAL; INFILTRATION; INTRACAUDAL; PERINEURAL PRN
Status: DISCONTINUED | OUTPATIENT
Start: 2018-08-14 | End: 2018-08-14 | Stop reason: SDUPTHER

## 2018-08-14 RX ORDER — ACETAMINOPHEN 325 MG/1
650 TABLET ORAL EVERY 4 HOURS PRN
Status: CANCELLED | OUTPATIENT
Start: 2018-08-14

## 2018-08-14 RX ORDER — SODIUM CHLORIDE 0.9 % (FLUSH) 0.9 %
10 SYRINGE (ML) INJECTION PRN
Status: DISCONTINUED | OUTPATIENT
Start: 2018-08-14 | End: 2018-08-14 | Stop reason: HOSPADM

## 2018-08-14 RX ORDER — SODIUM, POTASSIUM,MAG SULFATES 17.5-3.13G
1 SOLUTION, RECONSTITUTED, ORAL ORAL ONCE
Qty: 2 BOTTLE | Refills: 0 | Status: SHIPPED | OUTPATIENT
Start: 2018-08-14 | End: 2018-08-14

## 2018-08-14 RX ORDER — MIDAZOLAM HYDROCHLORIDE 1 MG/ML
INJECTION INTRAMUSCULAR; INTRAVENOUS PRN
Status: DISCONTINUED | OUTPATIENT
Start: 2018-08-14 | End: 2018-08-14 | Stop reason: SDUPTHER

## 2018-08-14 RX ORDER — ONDANSETRON 2 MG/ML
4 INJECTION INTRAMUSCULAR; INTRAVENOUS EVERY 8 HOURS PRN
Status: CANCELLED | OUTPATIENT
Start: 2018-08-14

## 2018-08-14 RX ORDER — SODIUM CHLORIDE, SODIUM LACTATE, POTASSIUM CHLORIDE, CALCIUM CHLORIDE 600; 310; 30; 20 MG/100ML; MG/100ML; MG/100ML; MG/100ML
INJECTION, SOLUTION INTRAVENOUS CONTINUOUS
Status: DISCONTINUED | OUTPATIENT
Start: 2018-08-14 | End: 2018-08-14 | Stop reason: HOSPADM

## 2018-08-14 RX ORDER — SODIUM CHLORIDE 0.9 % (FLUSH) 0.9 %
10 SYRINGE (ML) INJECTION PRN
Status: CANCELLED | OUTPATIENT
Start: 2018-08-14

## 2018-08-14 RX ORDER — HYDROCODONE BITARTRATE AND ACETAMINOPHEN 5; 325 MG/1; MG/1
1 TABLET ORAL EVERY 6 HOURS PRN
Qty: 12 TABLET | Refills: 0 | Status: SHIPPED | OUTPATIENT
Start: 2018-08-14 | End: 2018-08-17

## 2018-08-14 RX ORDER — SODIUM CHLORIDE, SODIUM LACTATE, POTASSIUM CHLORIDE, CALCIUM CHLORIDE 600; 310; 30; 20 MG/100ML; MG/100ML; MG/100ML; MG/100ML
INJECTION, SOLUTION INTRAVENOUS CONTINUOUS
Status: CANCELLED | OUTPATIENT
Start: 2018-08-14

## 2018-08-14 RX ORDER — ONDANSETRON 2 MG/ML
INJECTION INTRAMUSCULAR; INTRAVENOUS PRN
Status: DISCONTINUED | OUTPATIENT
Start: 2018-08-14 | End: 2018-08-14 | Stop reason: SDUPTHER

## 2018-08-14 RX ADMIN — ONDANSETRON 4 MG: 2 INJECTION, SOLUTION INTRAMUSCULAR; INTRAVENOUS at 08:04

## 2018-08-14 RX ADMIN — PROPOFOL 200 MG: 10 INJECTION, EMULSION INTRAVENOUS at 08:04

## 2018-08-14 RX ADMIN — CEFAZOLIN SODIUM 2 G: 2 SOLUTION INTRAVENOUS at 07:54

## 2018-08-14 RX ADMIN — FENTANYL CITRATE 50 MCG: 50 INJECTION, SOLUTION INTRAMUSCULAR; INTRAVENOUS at 08:00

## 2018-08-14 RX ADMIN — MIDAZOLAM HYDROCHLORIDE 1 MG: 2 INJECTION, SOLUTION INTRAMUSCULAR; INTRAVENOUS at 08:00

## 2018-08-14 RX ADMIN — KETOROLAC TROMETHAMINE 30 MG: 30 INJECTION, SOLUTION INTRAMUSCULAR at 08:04

## 2018-08-14 RX ADMIN — SODIUM CHLORIDE, POTASSIUM CHLORIDE, SODIUM LACTATE AND CALCIUM CHLORIDE: 600; 310; 30; 20 INJECTION, SOLUTION INTRAVENOUS at 07:04

## 2018-08-14 RX ADMIN — DEXAMETHASONE SODIUM PHOSPHATE 10 MG: 10 INJECTION INTRAMUSCULAR; INTRAVENOUS at 08:04

## 2018-08-14 RX ADMIN — LIDOCAINE HYDROCHLORIDE 5 MG: 10 INJECTION, SOLUTION EPIDURAL; INFILTRATION; INTRACAUDAL; PERINEURAL at 08:04

## 2018-08-14 ASSESSMENT — PAIN SCALES - GENERAL
PAINLEVEL_OUTOF10: 0

## 2018-08-14 ASSESSMENT — PAIN - FUNCTIONAL ASSESSMENT: PAIN_FUNCTIONAL_ASSESSMENT: 0-10

## 2018-08-14 NOTE — ANESTHESIA PRE PROCEDURE
History of bleeding ulcers Z87.11    Gastroesophageal reflux disease without esophagitis K21.9    NALINI (generalized anxiety disorder) F41.1    Essential hypertension I10    Abnormal finding on breast imaging R92.8       Past Medical History:        Diagnosis Date    Anxiety     Asthma     Bronchitis     Chronic back pain     Depression     Diverticulosis     Eczema     Fractures     history of broken toe    GERD (gastroesophageal reflux disease)     Hemorrhoids     Hiatal hernia     Hiatal hernia     History of bleeding ulcers     Hives     Migraine        Past Surgical History:        Procedure Laterality Date    COLONOSCOPY  2012    Dr. Fabian Lema         Social History:    Social History   Substance Use Topics    Smoking status: Former Smoker     Packs/day: 1.00     Years: 28.00     Types: Cigarettes     Quit date: 8/12/2017    Smokeless tobacco: Never Used      Comment: chantix used to quit smoking    Alcohol use No      Comment: less than weekly                                Counseling given: Not Answered      Vital Signs (Current):   Vitals:    08/14/18 0640   BP: 138/73   Pulse: 77   Resp: 16   Temp: 36.4 °C (97.6 °F)   TempSrc: Temporal   SpO2: 99%   Weight: 165 lb (74.8 kg)   Height: 5' 2\" (1.575 m)                                              BP Readings from Last 3 Encounters:   08/14/18 138/73   07/19/18 118/70   07/17/18 138/82       NPO Status: Time of last liquid consumption: 2300                        Time of last solid consumption: 2300                        Date of last liquid consumption: 08/13/18                        Date of last solid food consumption: 08/13/18    BMI:   Wt Readings from Last 3 Encounters:   08/14/18 165 lb (74.8 kg)   07/19/18 161 lb (73 kg)   07/17/18 161 lb (73 kg)     Body mass index is 30.18 kg/m².     CBC:   Lab Results   Component Value Date    WBC 7.7 08/09/2018    RBC 4.35 08/09/2018    RBC

## 2018-08-14 NOTE — OP NOTE
was used, and a copious  amount of endometrial tissue was sent as a separate specimen. Then the  cervical length was measured to 3 cm. The sound was 10, so the setting  with the ablation array was at 6.5 length, 4.6 width, and 164 kwan. It  was necessary to place a gauze around the Novasure ablation array so it did  pass cavity assessment; and with this Xeroform gauze around the collar,  assessment did pass. The cavity assessment then did pass and the ablation  took 60 seconds. The ablation array was removed without any difficulty  clearly having operated. Then the tenaculum was removed from the cervix. No bleeding noted, and all sponge and instrument counts were noted to be  correct. The patient is taken to the recovery room in good condition. She  will be discharged with some Norco for any postoperative pain relief.         Hilario Castano    D: 08/14/2018 9:02:47       T: 08/14/2018 9:04:41     [email or web address]  Job#: [id number]     Doc#: [id number]    CC:

## 2018-08-16 ENCOUNTER — ANESTHESIA (OUTPATIENT)
Dept: OPERATING ROOM | Age: 43
End: 2018-08-16
Payer: COMMERCIAL

## 2018-08-16 ENCOUNTER — HOSPITAL ENCOUNTER (OUTPATIENT)
Age: 43
Setting detail: OUTPATIENT SURGERY
Discharge: HOME | End: 2018-08-16
Attending: SURGERY | Admitting: SURGERY
Payer: COMMERCIAL

## 2018-08-16 VITALS
DIASTOLIC BLOOD PRESSURE: 63 MMHG | RESPIRATION RATE: 10 BRPM | SYSTOLIC BLOOD PRESSURE: 104 MMHG | TEMPERATURE: 97.7 F | OXYGEN SATURATION: 99 %

## 2018-08-16 VITALS
HEART RATE: 84 BPM | DIASTOLIC BLOOD PRESSURE: 86 MMHG | WEIGHT: 164.4 LBS | OXYGEN SATURATION: 99 % | HEIGHT: 63 IN | TEMPERATURE: 97.6 F | SYSTOLIC BLOOD PRESSURE: 121 MMHG | RESPIRATION RATE: 18 BRPM | BODY MASS INDEX: 29.13 KG/M2

## 2018-08-16 PROBLEM — Z12.11 ENCOUNTER FOR SCREENING COLONOSCOPY: Status: ACTIVE | Noted: 2018-08-16

## 2018-08-16 LAB — SURGICAL PATHOLOGY REPORT: NORMAL

## 2018-08-16 PROCEDURE — 2580000003 HC RX 258: Performed by: SURGERY

## 2018-08-16 PROCEDURE — 2500000003 HC RX 250 WO HCPCS: Performed by: NURSE ANESTHETIST, CERTIFIED REGISTERED

## 2018-08-16 PROCEDURE — 3700000000 HC ANESTHESIA ATTENDED CARE: Performed by: SURGERY

## 2018-08-16 PROCEDURE — 3609012400 HC EGD TRANSORAL BIOPSY SINGLE/MULTIPLE: Performed by: SURGERY

## 2018-08-16 PROCEDURE — 3700000001 HC ADD 15 MINUTES (ANESTHESIA): Performed by: SURGERY

## 2018-08-16 PROCEDURE — 2709999900 HC NON-CHARGEABLE SUPPLY: Performed by: SURGERY

## 2018-08-16 PROCEDURE — 7100000011 HC PHASE II RECOVERY - ADDTL 15 MIN: Performed by: SURGERY

## 2018-08-16 PROCEDURE — 3609027000 HC COLONOSCOPY: Performed by: SURGERY

## 2018-08-16 PROCEDURE — 88305 TISSUE EXAM BY PATHOLOGIST: CPT

## 2018-08-16 PROCEDURE — 6370000000 HC RX 637 (ALT 250 FOR IP): Performed by: NURSE ANESTHETIST, CERTIFIED REGISTERED

## 2018-08-16 PROCEDURE — 43239 EGD BIOPSY SINGLE/MULTIPLE: CPT | Performed by: SURGERY

## 2018-08-16 PROCEDURE — 7100000010 HC PHASE II RECOVERY - FIRST 15 MIN: Performed by: SURGERY

## 2018-08-16 PROCEDURE — 6360000002 HC RX W HCPCS: Performed by: NURSE ANESTHETIST, CERTIFIED REGISTERED

## 2018-08-16 PROCEDURE — 87077 CULTURE AEROBIC IDENTIFY: CPT

## 2018-08-16 PROCEDURE — 45378 DIAGNOSTIC COLONOSCOPY: CPT | Performed by: SURGERY

## 2018-08-16 RX ORDER — ACETAMINOPHEN 325 MG/1
650 TABLET ORAL EVERY 4 HOURS PRN
Status: DISCONTINUED | OUTPATIENT
Start: 2018-08-16 | End: 2018-08-16 | Stop reason: HOSPADM

## 2018-08-16 RX ORDER — LIDOCAINE HYDROCHLORIDE 10 MG/ML
INJECTION, SOLUTION EPIDURAL; INFILTRATION; INTRACAUDAL; PERINEURAL PRN
Status: DISCONTINUED | OUTPATIENT
Start: 2018-08-16 | End: 2018-08-16 | Stop reason: SDUPTHER

## 2018-08-16 RX ORDER — SODIUM CHLORIDE, SODIUM LACTATE, POTASSIUM CHLORIDE, CALCIUM CHLORIDE 600; 310; 30; 20 MG/100ML; MG/100ML; MG/100ML; MG/100ML
INJECTION, SOLUTION INTRAVENOUS CONTINUOUS
Status: DISCONTINUED | OUTPATIENT
Start: 2018-08-16 | End: 2018-08-16 | Stop reason: HOSPADM

## 2018-08-16 RX ORDER — SODIUM CHLORIDE 0.9 % (FLUSH) 0.9 %
10 SYRINGE (ML) INJECTION PRN
Status: DISCONTINUED | OUTPATIENT
Start: 2018-08-16 | End: 2018-08-16 | Stop reason: HOSPADM

## 2018-08-16 RX ORDER — PROPOFOL 10 MG/ML
INJECTION, EMULSION INTRAVENOUS CONTINUOUS PRN
Status: DISCONTINUED | OUTPATIENT
Start: 2018-08-16 | End: 2018-08-16 | Stop reason: SDUPTHER

## 2018-08-16 RX ORDER — GLYCOPYRROLATE 1 MG/5 ML
SYRINGE (ML) INTRAVENOUS PRN
Status: DISCONTINUED | OUTPATIENT
Start: 2018-08-16 | End: 2018-08-16 | Stop reason: SDUPTHER

## 2018-08-16 RX ORDER — SODIUM CHLORIDE 0.9 % (FLUSH) 0.9 %
10 SYRINGE (ML) INJECTION EVERY 12 HOURS SCHEDULED
Status: DISCONTINUED | OUTPATIENT
Start: 2018-08-16 | End: 2018-08-16 | Stop reason: HOSPADM

## 2018-08-16 RX ORDER — FENTANYL CITRATE 50 UG/ML
INJECTION, SOLUTION INTRAMUSCULAR; INTRAVENOUS PRN
Status: DISCONTINUED | OUTPATIENT
Start: 2018-08-16 | End: 2018-08-16 | Stop reason: SDUPTHER

## 2018-08-16 RX ORDER — MIDAZOLAM HYDROCHLORIDE 1 MG/ML
INJECTION INTRAMUSCULAR; INTRAVENOUS PRN
Status: DISCONTINUED | OUTPATIENT
Start: 2018-08-16 | End: 2018-08-16 | Stop reason: SDUPTHER

## 2018-08-16 RX ORDER — 0.9 % SODIUM CHLORIDE 0.9 %
10 VIAL (ML) INJECTION PRN
Status: DISCONTINUED | OUTPATIENT
Start: 2018-08-16 | End: 2018-08-16 | Stop reason: HOSPADM

## 2018-08-16 RX ORDER — 0.9 % SODIUM CHLORIDE 0.9 %
10 VIAL (ML) INJECTION EVERY 12 HOURS SCHEDULED
Status: DISCONTINUED | OUTPATIENT
Start: 2018-08-16 | End: 2018-08-16 | Stop reason: HOSPADM

## 2018-08-16 RX ORDER — ONDANSETRON 2 MG/ML
4 INJECTION INTRAMUSCULAR; INTRAVENOUS EVERY 6 HOURS PRN
Status: DISCONTINUED | OUTPATIENT
Start: 2018-08-16 | End: 2018-08-16 | Stop reason: HOSPADM

## 2018-08-16 RX ADMIN — FENTANYL CITRATE 25 MCG: 50 INJECTION, SOLUTION INTRAMUSCULAR; INTRAVENOUS at 08:45

## 2018-08-16 RX ADMIN — Medication 0.2 MG: at 08:25

## 2018-08-16 RX ADMIN — LIDOCAINE HYDROCHLORIDE 15 ML: 20 SOLUTION ORAL; TOPICAL at 08:35

## 2018-08-16 RX ADMIN — PROPOFOL 75 MCG/KG/MIN: 10 INJECTION, EMULSION INTRAVENOUS at 08:35

## 2018-08-16 RX ADMIN — MIDAZOLAM HYDROCHLORIDE 2 MG: 2 INJECTION, SOLUTION INTRAMUSCULAR; INTRAVENOUS at 08:25

## 2018-08-16 RX ADMIN — FENTANYL CITRATE 25 MCG: 50 INJECTION, SOLUTION INTRAMUSCULAR; INTRAVENOUS at 08:50

## 2018-08-16 RX ADMIN — FENTANYL CITRATE 50 MCG: 50 INJECTION, SOLUTION INTRAMUSCULAR; INTRAVENOUS at 08:35

## 2018-08-16 RX ADMIN — SODIUM CHLORIDE, POTASSIUM CHLORIDE, SODIUM LACTATE AND CALCIUM CHLORIDE: 600; 310; 30; 20 INJECTION, SOLUTION INTRAVENOUS at 07:26

## 2018-08-16 RX ADMIN — LIDOCAINE HYDROCHLORIDE 4 ML: 10 INJECTION, SOLUTION EPIDURAL; INFILTRATION; INTRACAUDAL; PERINEURAL at 08:25

## 2018-08-16 ASSESSMENT — PAIN SCALES - GENERAL: PAINLEVEL_OUTOF10: 0

## 2018-08-16 ASSESSMENT — PAIN - FUNCTIONAL ASSESSMENT: PAIN_FUNCTIONAL_ASSESSMENT: 0-10

## 2018-08-16 NOTE — ANESTHESIA POSTPROCEDURE EVALUATION
Department of Anesthesiology  Postprocedure Note    Patient: Braeden Mras  MRN: [id number]  YOB: 1975  Date of evaluation: 8/16/2018  Time:  9:12 AM     Procedure Summary     Date:  08/16/18 Room / Location:  [facility] ENDO 01 / [address]    Anesthesia Start:  8021 Anesthesia Stop:  3990    Procedures:       COLONOSCOPY (N/A Abdomen)      EGD BIOPSY (N/A ) Diagnosis:  (EPIGASTRIC PAIN)    Surgeon:  Hallie Zhong MD Responsible Provider:  IRVING King CRNA    Anesthesia Type:  MAC ASA Status:  2          Anesthesia Type: MAC    Sharan Phase I: Sharan Score: 10    Sharan Phase II:      Last vitals: Reviewed and per EMR flowsheets.        Anesthesia Post Evaluation    Patient location during evaluation: bedside  Patient participation: complete - patient participated  Level of consciousness: awake and alert  Airway patency: patent  Nausea & Vomiting: no vomiting and no nausea  Cardiovascular status: hemodynamically stable  Respiratory status: acceptable and spontaneous ventilation  Hydration status: euvolemic

## 2018-08-16 NOTE — ANESTHESIA PRE PROCEDURE
Department of Anesthesiology  Preprocedure Note       Name:  Sandoval Benral   Age:  37 y.o.  :  1975                                          MRN:  [id number]         Date:  2018      Surgeon: Lito Nuñez):  Maria Victoria Mercado MD    Procedure: Procedure(s):  COLONOSCOPY  EGD ESOPHAGOGASTRODUODENOSCOPY    Medications prior to admission:   Prior to Admission medications    Medication Sig Start Date End Date Taking? Authorizing Provider   HYDROcodone-acetaminophen (NORCO) 5-325 MG per tablet Take 1 tablet by mouth every 6 hours as needed for Pain for up to 3 days. Intended supply: 3 days. Take lowest dose possible to manage pain. 18  Ankita Lemon MD   sertraline (ZOLOFT) 50 MG tablet Take 1 tablet by mouth daily 18   Elder Bamberger, APRN - CNP   pantoprazole (PROTONIX) 40 MG tablet Take 1 tab first thing in AM on empty stomach 18   Elder Bamberger, APRN - CNP   hydrochlorothiazide (HYDRODIURIL) 25 MG tablet Take 1 tablet by mouth daily 18   Elder Bamberger, APRN - CNP   Multiple Vitamins-Calcium (ONE-A-DAY WOMENS PO) Take by mouth    Historical Provider, MD   vitamin B-12 (CYANOCOBALAMIN) 1000 MCG tablet Take 1,000 mcg by mouth daily    Historical Provider, MD   ibuprofen (ADVIL;MOTRIN) 600 MG tablet Take 600 mg by mouth every 6 hours as needed for Pain. Historical Provider, MD       Current medications:    Current Facility-Administered Medications   Medication Dose Route Frequency Provider Last Rate Last Dose    lactated ringers infusion   Intravenous Continuous Maria Victoria Mercado  mL/hr at 18 0726      sodium chloride (PF) 0.9 % injection 10 mL  10 mL Intravenous 2 times per day Maria Victoria Mercado MD        sodium chloride (PF) 0.9 % injection 10 mL  10 mL Intravenous PRN Maria Victoria Mercado MD           Allergies:     Allergies   Allergen Reactions    Seasonal        Problem List:    Patient Active Problem List   Diagnosis Code    History of bleeding ulcers lb 6.4 oz (74.6 kg)   08/14/18 165 lb (74.8 kg)   07/19/18 161 lb (73 kg)     Body mass index is 29.12 kg/m². CBC:   Lab Results   Component Value Date    WBC 7.7 08/09/2018    RBC 4.35 08/09/2018    RBC 4.47 04/26/2012    HGB 12.7 08/09/2018    HCT 38.2 08/09/2018    MCV 87.8 08/09/2018    RDW 15.2 08/09/2018     08/09/2018     04/26/2012       CMP:   Lab Results   Component Value Date     08/09/2018    K 3.9 08/09/2018     08/09/2018    CO2 25 08/09/2018    BUN 13 08/09/2018    CREATININE 0.75 08/09/2018    GFRAA >60 08/09/2018    LABGLOM >60 08/09/2018    GLUCOSE 117 08/09/2018    GLUCOSE 111 04/26/2012    PROT 6.8 08/09/2018    CALCIUM 10.3 08/09/2018    BILITOT <0.10 08/09/2018    ALKPHOS 102 08/09/2018    AST 18 08/09/2018    ALT 20 08/09/2018       POC Tests: No results for input(s): POCGLU, POCNA, POCK, POCCL, POCBUN, POCHEMO, POCHCT in the last 72 hours. Coags: No results found for: PROTIME, INR, APTT    HCG (If Applicable):   Lab Results   Component Value Date    PREGTESTUR NEGATIVE 03/20/2012        ABGs: No results found for: PHART, PO2ART, PEC6EBI, YXM7EUW, BEART, M6CSTCJQ     Type & Screen (If Applicable):  No results found for: LABABO, [address]    Anesthesia Evaluation  Patient summary reviewed and Nursing notes reviewed no history of anesthetic complications:   Airway: Mallampati: II  TM distance: >3 FB   Neck ROM: full  Mouth opening: > = 3 FB Dental: normal exam         Pulmonary:normal exam    (+) asthma:                            Cardiovascular:Negative CV ROS    (+) hypertension:,          Beta Blocker:  Not on Beta Blocker         Neuro/Psych:   (+) neuromuscular disease:, headaches: migraine headaches, psychiatric history:depression/anxiety             GI/Hepatic/Renal:   (+) hiatal hernia, GERD: no interval change,           Endo/Other: Negative Endo/Other ROS             Pt had PAT visit.        Abdominal:           Vascular: Anesthesia Plan      MAC     ASA 2             Anesthetic plan and risks discussed with patient. Plan discussed with attending.                   IRVING Cabello - CRNA   8/16/2018

## 2018-08-16 NOTE — OP NOTE
[facility]                              [address] [address]                                 OPERATIVE REPORT    PATIENT NAME: Satinder Perry                     :        1975  MED REC NO:   [id number]                              ROOM:  ACCOUNT NO:   [de-identified]                           ADMIT DATE: 2018  PROVIDER:     Lydia Mark      DATE OF PROCEDURE:  2018    ATTENDING SURGEON:  Dr. Lydia Mark    PCP:  Zeferino Escobar    PREOPERATIVE DIAGNOSES:  1. History of peptic ulcer disease. 2.  Screening colonoscopy. POSTOPERATIVE DIAGNOSES:  1. Minimal gastritis. 2.  Sigmoid diverticulosis. OPERATION:  1. Esophagogastroduodenoscopy. 2.  Prepyloric antral biopsies. 3.  Colonoscopy, anus to cecum. ANESTHESIA:  MAC.    INDICATIONS:  The patient is a pleasant 42-year-old white female who years  ago, was found to have gastric ulcers. She has been taking Protonix for  quite some time with excellent control symptoms. No family history of  colon cancer. At this time, EGD and colonoscopy is indicated. OPERATIVE PROCEDURE:  After obtaining an informed consent with discussion  of risks, benefits and alternatives including a remote risk of GI  perforation and missed lesions, the patient was brought was taken to the  endoscopy suite and placed in the left lateral recumbent position. Following adequate IV sedation, an endoscope was passed over the tongue  into the posterior pharynx, vocal folds were visualized and appeared  normal.  Scope was directed into the esophagus and onto the GE junction;  upper, mid and lower esophagus all appeared normal.  There was no  esophagitis, no rings, no webs, no varices. The stomach was entered, had  normal distensibility. On retroflexion, the fundus and cardia appeared  normal.  The body and prepyloric antrum had very minimal gastritis without  ulceration.   The pylorus was patent. The duodenum was entered; first,  second, and third portions appeared normal.  The stomach was decompressed  by suction as the scope was removed. A digital rectal exam was performed. Sphincter tone was normal.  A colonoscope was passed transanally into the  rectum and advanced with gentle insufflation throughout the entirety of the  colon to the cecum. Cecal position was confirmed by clear visualization of  the ileocecal valve, light in the right lower quadrant, and transduction of  manual pressure in the right lower quadrant to the cecum. The bowel prep  was excellent. All colonic mucosa was clearly visible. The cecum,  ascending colon, and hepatic flexure were normal.  Transverse colon,  splenic flexure, descending colon were all normal.  The sigmoid colon had  scattered diverticula. Upper, middle, and lower portions of the rectum  were normal.  On retroflexion, there were minimal internal hemorrhoids. The colon was decompressed by suction as the scope was removed. No colonic  biopsies were required. The patient tolerated the procedure well and was  transferred to PACU in stable condition. SPECIMENS:  Prepyloric antral biopsies. DRAINS:  None. COMPLICATIONS:  None. DISPOSITION:  To PACU awake, alert and stable. Following recovery, we will  discharge the patient home with a gradual advancement of diet and activity  as tolerated with instructions for a high-fiber, low-fat diet with fiber  supplementation and increased water intake. We will recommend  discontinuation of Protonix to treat epigastric and reflux symptoms on an  episodic symptom basis. Recommend next screening colonoscopy in 5 to 10  years. Followup will be with me in 1 to 2 weeks and with Saul Anderson at  her next regular appointment. My thanks to Angela Abel for the consultation.         ERIC Severo Satchel    D: 08/16/2018 9:15:29       T: 08/16/2018 9:16:31     [email or web address]  Job#: [id number]     Doc#:

## 2018-08-17 LAB
DIRECT EXAM: NEGATIVE
Lab: NORMAL
SPECIMEN DESCRIPTION: NORMAL
STATUS: NORMAL
SURGICAL PATHOLOGY REPORT: NORMAL

## 2018-09-09 ENCOUNTER — APPOINTMENT (OUTPATIENT)
Dept: GENERAL RADIOLOGY | Age: 43
End: 2018-09-09
Payer: COMMERCIAL

## 2018-09-09 ENCOUNTER — HOSPITAL ENCOUNTER (EMERGENCY)
Age: 43
Discharge: HOME | End: 2018-09-09
Attending: EMERGENCY MEDICINE
Payer: COMMERCIAL

## 2018-09-09 VITALS
HEART RATE: 83 BPM | SYSTOLIC BLOOD PRESSURE: 121 MMHG | OXYGEN SATURATION: 99 % | DIASTOLIC BLOOD PRESSURE: 71 MMHG | RESPIRATION RATE: 20 BRPM | TEMPERATURE: 99 F

## 2018-09-09 DIAGNOSIS — S93.601A SPRAIN OF RIGHT FOOT, INITIAL ENCOUNTER: Primary | ICD-10-CM

## 2018-09-09 PROCEDURE — 73630 X-RAY EXAM OF FOOT: CPT

## 2018-09-09 PROCEDURE — 73610 X-RAY EXAM OF ANKLE: CPT

## 2018-09-09 PROCEDURE — 99283 EMERGENCY DEPT VISIT LOW MDM: CPT

## 2018-09-09 ASSESSMENT — PAIN SCALES - GENERAL: PAINLEVEL_OUTOF10: 6

## 2018-09-09 NOTE — LETTER
[facility] ED  [address] [address]  Phone: [phone]               September 9, 2018    Patient: Linda Ambrosio   YOB: 1975   Date of Visit: 9/9/2018       To Whom It May Concern:    Brenda Arevalo was seen and treated in our emergency department on 9/9/2018. She may return to work on 9-.       Sincerely,       Nahomy Javed RN         Signature:__________________________________

## 2018-09-10 ENCOUNTER — TELEPHONE (OUTPATIENT)
Dept: FAMILY MEDICINE CLINIC | Age: 43
End: 2018-09-10

## 2018-09-10 NOTE — TELEPHONE ENCOUNTER
Patient is following with Gerard Dodd, has an appointment with him.      Patient also recently seen both surgeon and OBGYN

## 2018-09-10 NOTE — ED PROVIDER NOTES
eMERGENCY dEPARTMENT eNCOUnter        [address] [facility]    Chief Complaint   Patient presents with    Foot Injury     right foot injury, slipped on wet deck       HPI    Gayathri Rosa is a 37 y.o. female who presents to ED from home. By car  With complaint of Right foot injury  Onset today. Patient slipped on a wet deck  Intensity of symptoms moderate to right foot pain  Location of symptoms right foot. Patient states that she slipped on a wet deck. Her left foot went forward and right foot went under her.       REVIEW OF SYSTEMS   No other injuries   All systems reviewed and positives are in the HPI      PAST MEDICAL HISTORY    Past Medical History:   Diagnosis Date    Anxiety     Asthma     Bronchitis     Chronic back pain     Depression     Diverticulosis     Eczema     Fractures     history of broken toe    GERD (gastroesophageal reflux disease)     Hemorrhoids     Hiatal hernia     Hiatal hernia     History of bleeding ulcers     Hives     Migraine        SURGICAL HISTORY    Past Surgical History:   Procedure Laterality Date    COLONOSCOPY  2012    Dr. Gena Barker OFFICE/OUTPT VISIT,PROCEDURE ONLY N/A 8/14/2018    DILATATION AND CURETTAGE HYSTEROSCOPY NOVASURE ABLATION, & Polypectomy performed by Clementina Mejia MD at [address] [facility] OFFICE/OUTPT [address] [facility] N/A 8/16/2018    COLONOSCOPY, Sigmoid Diverticulosis performed by Mulugeta Banks MD at [address] [facility] GASTROINTESTINAL ENDOSCOPY N/A 8/16/2018    EGD BIOPSY, Mild Gastritis performed by Mulugeta Banks MD at [place] [address]    Current Outpatient Rx   Medication Sig Dispense Refill    sertraline (ZOLOFT) 50 MG tablet Take 1 tablet by mouth daily 90 tablet 1    pantoprazole (PROTONIX) 40 MG tablet Take 1 tab first thing in AM on empty stomach 90 tablet 1    hydrochlorothiazide (HYDRODIURIL) 25 MG tablet density between the first and second metatarsal bases may be    developmental variation or age-indeterminate fracture. If clinical symptoms    warrant, CT right foot could be considered for further characterization. 2. Soft tissue swelling of the ankle. XR ANKLE RIGHT (MIN 3 VIEWS)   Preliminary Result   Preliminary report only      IMPRESSION:       1. Ossific density between the first and second metatarsal bases may be    developmental variation or age-indeterminate fracture. If clinical symptoms    warrant, CT right foot could be considered for further characterization. 2. Soft tissue swelling of the ankle. Labs  Labs Reviewed - No data to display          Summation      Patient Course: Patient is sent home to follow-up with Ortho (podiatrist if not better    ED Medications administered this visit:  Medications - No data to display    New Prescriptions from this visit:    New Prescriptions    No medications on file       Follow-up:  No follow-up provider specified. Final Impression:   1.  Sprain of right foot, initial encounter               (Please note that portions of this note were completed with a voice recognition program.  Efforts were made to edit the dictations but occasionally words are mis-transcribed.)        Bryanna Thompson MD  09/09/18 [id number]

## 2018-09-10 NOTE — TELEPHONE ENCOUNTER
Patient with some follow up testing/assessment that are due. 1. I seen her in May with asymmetrical clinical exam of thyroid right side enlarged and requested 3 month recheck I do not see that she has appt scheduled with me? (should have been early September)    2. Cholesterol medication was advised lipitor 10 mg since labs were done after visit with me , I do not see that she made decision to take this? I never sent order in without her agreement? See 6/4/18 telephone call with details. 3. Anemia is improved with recent repeat blood count in August usually takes about 3 months of supplemental iron to replace blood count to normal which is now and iron stores in body. How long did she take iron, and is she still taking it? 4. Seen both Dr. Katelynn Chandler for heavy menses and Dr. Racquel Lewis for repeat scope (EGD)      Would recommend above ? And seeing me in office in October if possible to review all. Can be annual physical if she would like. Any refills please just let me know I think she seen Peterson Abdullahi once when I was out in July for refills. If routine appts kept can just call if refills needed in future.      Thanks  Leon Orlando

## 2018-09-15 PROBLEM — Z12.11 ENCOUNTER FOR SCREENING COLONOSCOPY: Status: RESOLVED | Noted: 2018-08-16 | Resolved: 2018-09-15

## 2018-12-11 ENCOUNTER — TELEPHONE (OUTPATIENT)
Dept: FAMILY MEDICINE CLINIC | Age: 43
End: 2018-12-11

## 2018-12-11 NOTE — TELEPHONE ENCOUNTER
Ravindra Latif is doing an LPN to RN class. She needs her Hep B titer and a tetanus shot. Can the titer be ordered and can she get the shot. Please let Katharine know.     Check up 12/19    Health Maintenance   Topic Date Due    HIV screen  02/24/1988    DTaP/Tdap/Td vaccine (1 - Tdap) 02/24/1992    Flu vaccine (1) 01/03/2019 (Originally 9/1/2018)    Diabetes screen  02/24/2019    Potassium monitoring  08/09/2019    Creatinine monitoring  08/09/2019    Cervical cancer screen  11/20/2020    Lipid screen  05/17/2023             (applicable per patient's age: Cancer Screenings, Depression Screening, Fall Risk Screening, Immunizations)    Hemoglobin A1C (%)   Date Value   02/24/2016 5.6     LDL Calculated (mg/dL)   Date Value   05/17/2018 205 (A)     AST (U/L)   Date Value   08/09/2018 18     ALT (U/L)   Date Value   08/09/2018 20     BUN (mg/dL)   Date Value   08/09/2018 13      (goal A1C is < 7)   (goal LDL is <100) need 30-50% reduction from baseline     BP Readings from Last 3 Encounters:   09/09/18 121/71   08/16/18 104/63   08/16/18 121/86    (goal /80)      All Future Testing planned in [facility]:  Lab Frequency Next Occurrence   Type And Screen Once 07/17/2018       Next Visit Date:  Future Appointments  Date Time Provider Sal Waterman   12/19/2018 1:00 PM Lennox Buzzard, APRN - [facility]            Patient Active Problem List:     History of bleeding ulcers     Gastroesophageal reflux disease without esophagitis     NALINI (generalized anxiety disorder)     Essential hypertension     Abnormal finding on breast imaging

## 2018-12-27 ENCOUNTER — OFFICE VISIT (OUTPATIENT)
Dept: FAMILY MEDICINE CLINIC | Age: 43
End: 2018-12-27
Payer: COMMERCIAL

## 2018-12-27 VITALS
WEIGHT: 170 LBS | DIASTOLIC BLOOD PRESSURE: 86 MMHG | SYSTOLIC BLOOD PRESSURE: 124 MMHG | TEMPERATURE: 97.8 F | HEART RATE: 92 BPM | BODY MASS INDEX: 30.11 KG/M2 | OXYGEN SATURATION: 98 %

## 2018-12-27 DIAGNOSIS — Z02.0 SCHOOL PHYSICAL EXAM: ICD-10-CM

## 2018-12-27 DIAGNOSIS — F41.1 GAD (GENERALIZED ANXIETY DISORDER): ICD-10-CM

## 2018-12-27 DIAGNOSIS — E66.09 CLASS 1 OBESITY DUE TO EXCESS CALORIES WITHOUT SERIOUS COMORBIDITY WITH BODY MASS INDEX (BMI) OF 30.0 TO 30.9 IN ADULT: ICD-10-CM

## 2018-12-27 DIAGNOSIS — E78.2 MIXED HYPERLIPIDEMIA: ICD-10-CM

## 2018-12-27 DIAGNOSIS — I10 ESSENTIAL HYPERTENSION: ICD-10-CM

## 2018-12-27 DIAGNOSIS — D50.8 IRON DEFICIENCY ANEMIA SECONDARY TO INADEQUATE DIETARY IRON INTAKE: ICD-10-CM

## 2018-12-27 DIAGNOSIS — Z23 NEED FOR DIPHTHERIA-TETANUS-PERTUSSIS (TDAP) VACCINE: ICD-10-CM

## 2018-12-27 DIAGNOSIS — K21.9 GASTROESOPHAGEAL REFLUX DISEASE WITHOUT ESOPHAGITIS: Primary | ICD-10-CM

## 2018-12-27 PROCEDURE — 99214 OFFICE O/P EST MOD 30 MIN: CPT | Performed by: NURSE PRACTITIONER

## 2018-12-27 RX ORDER — SERTRALINE HYDROCHLORIDE 100 MG/1
100 TABLET, FILM COATED ORAL DAILY
Qty: 90 TABLET | Refills: 1 | Status: SHIPPED | OUTPATIENT
Start: 2018-12-27 | End: 2019-04-14 | Stop reason: SDUPTHER

## 2018-12-27 RX ORDER — HYDROCHLOROTHIAZIDE 25 MG/1
25 TABLET ORAL DAILY
Qty: 90 TABLET | Refills: 1 | Status: SHIPPED | OUTPATIENT
Start: 2018-12-27 | End: 2019-07-16 | Stop reason: SDUPTHER

## 2018-12-27 RX ORDER — PANTOPRAZOLE SODIUM 40 MG/1
TABLET, DELAYED RELEASE ORAL
Qty: 90 TABLET | Refills: 1 | Status: SHIPPED | OUTPATIENT
Start: 2018-12-27 | End: 2019-07-16 | Stop reason: SDUPTHER

## 2018-12-27 RX ORDER — PHENTERMINE HYDROCHLORIDE 37.5 MG/1
37.5 CAPSULE ORAL EVERY MORNING
Qty: 30 CAPSULE | Refills: 0 | Status: SHIPPED | OUTPATIENT
Start: 2018-12-27 | End: 2019-01-26

## 2018-12-27 ASSESSMENT — ENCOUNTER SYMPTOMS
DIARRHEA: 0
SHORTNESS OF BREATH: 0
HEARTBURN: 0
NAUSEA: 0
CONSTIPATION: 0
ORTHOPNEA: 0
SORE THROAT: 0
BACK PAIN: 0
VOMITING: 0
BLOOD IN STOOL: 0
COUGH: 0
ABDOMINAL PAIN: 0

## 2018-12-27 NOTE — PROGRESS NOTES
HPI Notes    Name: Alina Sadler  : 1975         Chief Complaint:     Chief Complaint   Patient presents with    Anxiety     Patient here today for check up    Gastroesophageal Reflux    Hypertension     Patient complains of feet swelling    Hyperlipidemia     Discuss elevated lipids, Maxime Lambert suggested she start lipitor 10mg daily    Anemia     Ashly Dowd CNP would like anemia discussed    Thyroid Problem     Ashly Dowd CNP mentioned in her last OV about thyroid enlargement right sided       History of Present Illness:        Gastroesophageal Reflux   She reports no abdominal pain, no chest pain, no coughing, no heartburn, no nausea or no sore throat. anxiety. The current episode started more than 1 year ago. The problem has been gradually improving. The symptoms are aggravated by certain foods. Risk factors include caffeine use, ETOH use, obesity and lack of exercise. She has tried a PPI for the symptoms. The treatment provided moderate relief. Hypertension   This is a chronic problem. The current episode started more than 1 year ago. The problem is controlled. Associated symptoms include anxiety and peripheral edema (mild). Pertinent negatives include no chest pain, headaches, neck pain, orthopnea, palpitations or shortness of breath. Risk factors for coronary artery disease include dyslipidemia, obesity and stress. Past treatments include diuretics. The current treatment provides moderate improvement. There are no compliance problems. Hyperlipidemia   This is a recurrent problem. The problem is controlled. Recent lipid tests were reviewed and are variable. Exacerbating diseases include obesity. Pertinent negatives include no chest pain or shortness of breath. She is currently on no antihyperlipidemic treatment. Compliance problems include adherence to diet and adherence to exercise. Mental Health Problem   The primary symptoms include dysphoric mood. Primary symptoms comment: anxiety.  The Karol Jimenez MD at [address] [facility] OFFICE/OUTPT [facility] [facility] N/A 8/16/2018    COLONOSCOPY, Sigmoid Diverticulosis performed by Tammy Gallagher MD at [address] ENDOSCOPY      UPPER GASTROINTESTINAL ENDOSCOPY N/A 8/16/2018    EGD BIOPSY, Mild Gastritis performed by Tammy Gallagher MD at [facility] OR        Medications:       Prior to Admission medications    Medication Sig Start Date End Date Taking? Authorizing Provider   sertraline (ZOLOFT) 100 MG tablet Take 1 tablet by mouth daily 12/27/18  Yes Anjana Brands, APRN - CNP   hydrochlorothiazide (HYDRODIURIL) 25 MG tablet Take 1 tablet by mouth daily 12/27/18  Yes Anjana Brands, APRN - CNP   pantoprazole (PROTONIX) 40 MG tablet Take 1 tab first thing in AM on empty stomach 12/27/18  Yes Anjana Brands, APRN - CNP   phentermine 37.5 MG capsule Take 1 capsule by mouth every morning for 30 days. . 12/27/18 1/26/19 Yes Anjana Brands, APRN - CNP   Multiple Vitamins-Calcium (ONE-A-DAY WOMENS PO) Take by mouth   Yes Historical Provider, MD   vitamin B-12 (CYANOCOBALAMIN) 1000 MCG tablet Take 1,000 mcg by mouth daily   Yes Historical Provider, MD   ibuprofen (ADVIL;MOTRIN) 600 MG tablet Take 600 mg by mouth every 6 hours as needed for Pain. Yes Historical Provider, MD        Allergies:       Seasonal    Social History:     Tobacco:    reports that she quit smoking about 16 months ago. Her smoking use included Cigarettes. She has a 28.00 pack-year smoking history. She has never used smokeless tobacco.  Alcohol:      reports that she does not drink alcohol. Drug Use:  reports that she does not use drugs. Family History:     Family History   Problem Relation Age of Onset    Cancer Maternal Grandfather         Bladder Cancer    Cancer Paternal Grandfather         Lung Cancer       Review of Systems:         Review of Systems   Constitutional: Negative for chills and fever.    HENT: Negative for sore very good on any question, please call the office to discuss how we could have made your experience a very good one. Thank you. Electronically signed by Memory Barthel, APRN - CNP on 12/27/2018 at 3:08 PM           Completed Refills   Requested Prescriptions     Signed Prescriptions Disp Refills    sertraline (ZOLOFT) 100 MG tablet 90 tablet 1     Sig: Take 1 tablet by mouth daily    hydrochlorothiazide (HYDRODIURIL) 25 MG tablet 90 tablet 1     Sig: Take 1 tablet by mouth daily    pantoprazole (PROTONIX) 40 MG tablet 90 tablet 1     Sig: Take 1 tab first thing in AM on empty stomach    phentermine 37.5 MG capsule 30 capsule 0     Sig: Take 1 capsule by mouth every morning for 30 days. Dominik Ballard received counseling on the following healthy behaviors: nutrition, exercise and medication adherence  Reviewed prior labs and health maintenance. Continue current medications, diet and exercise. Discussed use, benefit, and side effects of prescribed medications. Barriers to medication compliance addressed. Patient given educational materials - see patient instructions. All patient questions answered. Patient voiced understanding.

## 2019-01-03 ENCOUNTER — HOSPITAL ENCOUNTER (OUTPATIENT)
Age: 44
Discharge: HOME | End: 2019-01-03
Payer: COMMERCIAL

## 2019-01-03 DIAGNOSIS — E66.09 CLASS 1 OBESITY DUE TO EXCESS CALORIES WITHOUT SERIOUS COMORBIDITY WITH BODY MASS INDEX (BMI) OF 30.0 TO 30.9 IN ADULT: ICD-10-CM

## 2019-01-03 DIAGNOSIS — E78.2 MIXED HYPERLIPIDEMIA: ICD-10-CM

## 2019-01-03 DIAGNOSIS — D50.8 IRON DEFICIENCY ANEMIA SECONDARY TO INADEQUATE DIETARY IRON INTAKE: ICD-10-CM

## 2019-01-03 DIAGNOSIS — I10 ESSENTIAL HYPERTENSION: ICD-10-CM

## 2019-01-03 DIAGNOSIS — Z02.0 SCHOOL PHYSICAL EXAM: ICD-10-CM

## 2019-01-03 LAB
ABSOLUTE EOS #: 0.1 K/UL (ref 0–0.4)
ABSOLUTE IMMATURE GRANULOCYTE: ABNORMAL K/UL (ref 0–0.3)
ABSOLUTE LYMPH #: 2.2 K/UL (ref 1–4.8)
ABSOLUTE MONO #: 0.6 K/UL (ref 0–1)
BASOPHILS # BLD: 1 % (ref 0–2)
BASOPHILS ABSOLUTE: 0.1 K/UL (ref 0–0.2)
CHOLESTEROL/HDL RATIO: 6.7
CHOLESTEROL: 287 MG/DL
DIFFERENTIAL TYPE: YES
EOSINOPHILS RELATIVE PERCENT: 1 % (ref 0–5)
FERRITIN: 37 UG/L (ref 13–150)
FOLATE: >20 NG/ML
HBV SURFACE AB TITR SER: >1000 MIU/ML
HCT VFR BLD CALC: 40.5 % (ref 36–46)
HDLC SERPL-MCNC: 43 MG/DL
HEMOGLOBIN: 13.6 G/DL (ref 12–16)
IMMATURE GRANULOCYTES: ABNORMAL %
IRON SATURATION: 25 % (ref 20–55)
IRON: 84 UG/DL (ref 37–145)
LDL CHOLESTEROL: 197 MG/DL (ref 0–130)
LYMPHOCYTES # BLD: 29 % (ref 15–40)
MCH RBC QN AUTO: 30.2 PG (ref 26–34)
MCHC RBC AUTO-ENTMCNC: 33.5 G/DL (ref 31–37)
MCV RBC AUTO: 90.2 FL (ref 80–100)
MONOCYTES # BLD: 7 % (ref 4–8)
NRBC AUTOMATED: ABNORMAL PER 100 WBC
PATIENT FASTING?: YES
PDW BLD-RTO: 13.4 % (ref 12.1–15.2)
PLATELET # BLD: 468 K/UL (ref 140–450)
PLATELET ESTIMATE: ABNORMAL
PMV BLD AUTO: ABNORMAL FL (ref 6–12)
RBC # BLD: 4.49 M/UL (ref 4–5.2)
RBC # BLD: ABNORMAL 10*6/UL
SEG NEUTROPHILS: 62 % (ref 47–75)
SEGMENTED NEUTROPHILS ABSOLUTE COUNT: 4.8 K/UL (ref 2.5–7)
TOTAL IRON BINDING CAPACITY: 341 UG/DL (ref 250–450)
TRIGL SERPL-MCNC: 234 MG/DL
UNSATURATED IRON BINDING CAPACITY: 257 UG/DL (ref 112–347)
VITAMIN B-12: 916 PG/ML (ref 232–1245)
VLDLC SERPL CALC-MCNC: ABNORMAL MG/DL (ref 1–30)
WBC # BLD: 7.7 K/UL (ref 3.5–11)
WBC # BLD: ABNORMAL 10*3/UL

## 2019-01-03 PROCEDURE — 36415 COLL VENOUS BLD VENIPUNCTURE: CPT

## 2019-01-03 PROCEDURE — 86317 IMMUNOASSAY INFECTIOUS AGENT: CPT

## 2019-01-03 PROCEDURE — 82728 ASSAY OF FERRITIN: CPT

## 2019-01-03 PROCEDURE — 82607 VITAMIN B-12: CPT

## 2019-01-03 PROCEDURE — 83540 ASSAY OF IRON: CPT

## 2019-01-03 PROCEDURE — 82746 ASSAY OF FOLIC ACID SERUM: CPT

## 2019-01-03 PROCEDURE — 85025 COMPLETE CBC W/AUTO DIFF WBC: CPT

## 2019-01-03 PROCEDURE — 80061 LIPID PANEL: CPT

## 2019-01-03 PROCEDURE — 83550 IRON BINDING TEST: CPT

## 2019-01-29 ENCOUNTER — OFFICE VISIT (OUTPATIENT)
Dept: FAMILY MEDICINE CLINIC | Age: 44
End: 2019-01-29
Payer: COMMERCIAL

## 2019-01-29 VITALS
HEART RATE: 112 BPM | WEIGHT: 165 LBS | SYSTOLIC BLOOD PRESSURE: 110 MMHG | OXYGEN SATURATION: 98 % | BODY MASS INDEX: 29.23 KG/M2 | DIASTOLIC BLOOD PRESSURE: 80 MMHG

## 2019-01-29 DIAGNOSIS — Z71.3 WEIGHT LOSS COUNSELING, ENCOUNTER FOR: ICD-10-CM

## 2019-01-29 DIAGNOSIS — E66.3 OVER WEIGHT: Primary | ICD-10-CM

## 2019-01-29 DIAGNOSIS — Z23 NEED FOR MEASLES-MUMPS-RUBELLA (MMR) VACCINE: ICD-10-CM

## 2019-01-29 PROCEDURE — 90471 IMMUNIZATION ADMIN: CPT | Performed by: NURSE PRACTITIONER

## 2019-01-29 PROCEDURE — 90707 MMR VACCINE SC: CPT | Performed by: NURSE PRACTITIONER

## 2019-01-29 PROCEDURE — 99401 PREV MED CNSL INDIV APPRX 15: CPT | Performed by: NURSE PRACTITIONER

## 2019-01-29 RX ORDER — PHENTERMINE HYDROCHLORIDE 37.5 MG/1
TABLET ORAL
Qty: 30 TABLET | Refills: 0 | Status: SHIPPED | OUTPATIENT
Start: 2019-01-29 | End: 2019-02-26 | Stop reason: SDUPTHER

## 2019-01-29 RX ORDER — PHENTERMINE HYDROCHLORIDE 37.5 MG/1
TABLET ORAL
Refills: 0 | COMMUNITY
Start: 2018-12-27 | End: 2019-01-29 | Stop reason: SDUPTHER

## 2019-01-29 ASSESSMENT — ENCOUNTER SYMPTOMS
VOMITING: 0
SHORTNESS OF BREATH: 0
COUGH: 0
DIARRHEA: 0
NAUSEA: 0

## 2019-02-26 ENCOUNTER — OFFICE VISIT (OUTPATIENT)
Dept: FAMILY MEDICINE CLINIC | Age: 44
End: 2019-02-26
Payer: COMMERCIAL

## 2019-02-26 VITALS
HEART RATE: 88 BPM | BODY MASS INDEX: 30.11 KG/M2 | SYSTOLIC BLOOD PRESSURE: 122 MMHG | OXYGEN SATURATION: 98 % | WEIGHT: 170 LBS | TEMPERATURE: 97.7 F | DIASTOLIC BLOOD PRESSURE: 80 MMHG

## 2019-02-26 DIAGNOSIS — E66.3 OVER WEIGHT: ICD-10-CM

## 2019-02-26 DIAGNOSIS — Z71.3 WEIGHT LOSS COUNSELING, ENCOUNTER FOR: ICD-10-CM

## 2019-02-26 PROCEDURE — 99401 PREV MED CNSL INDIV APPRX 15: CPT | Performed by: NURSE PRACTITIONER

## 2019-02-26 RX ORDER — AMOXICILLIN AND CLAVULANATE POTASSIUM 875; 125 MG/1; MG/1
TABLET, FILM COATED ORAL
Refills: 0 | COMMUNITY
Start: 2019-02-15 | End: 2020-01-14 | Stop reason: ALTCHOICE

## 2019-02-26 RX ORDER — PHENTERMINE HYDROCHLORIDE 37.5 MG/1
TABLET ORAL
Qty: 30 TABLET | Refills: 0 | Status: SHIPPED | OUTPATIENT
Start: 2019-02-26 | End: 2019-03-28

## 2019-02-26 ASSESSMENT — ENCOUNTER SYMPTOMS
VOMITING: 0
SHORTNESS OF BREATH: 0
NAUSEA: 0
COUGH: 0
DIARRHEA: 0

## 2019-04-15 RX ORDER — SERTRALINE HYDROCHLORIDE 100 MG/1
TABLET, FILM COATED ORAL
Qty: 90 TABLET | Refills: 0 | Status: SHIPPED | OUTPATIENT
Start: 2019-04-15 | End: 2019-07-16 | Stop reason: SDUPTHER

## 2019-07-16 RX ORDER — SERTRALINE HYDROCHLORIDE 100 MG/1
TABLET, FILM COATED ORAL
Qty: 90 TABLET | Refills: 0 | Status: SHIPPED | OUTPATIENT
Start: 2019-07-16 | End: 2019-10-24 | Stop reason: SDUPTHER

## 2019-07-16 RX ORDER — PANTOPRAZOLE SODIUM 40 MG/1
TABLET, DELAYED RELEASE ORAL
Qty: 90 TABLET | Refills: 0 | Status: SHIPPED | OUTPATIENT
Start: 2019-07-16 | End: 2020-01-14 | Stop reason: SDUPTHER

## 2019-07-16 RX ORDER — SERTRALINE HYDROCHLORIDE 100 MG/1
TABLET, FILM COATED ORAL
Qty: 90 TABLET | Refills: 0 | Status: SHIPPED | OUTPATIENT
Start: 2019-07-16 | End: 2020-01-14 | Stop reason: SDUPTHER

## 2019-07-16 RX ORDER — HYDROCHLOROTHIAZIDE 25 MG/1
TABLET ORAL
Qty: 90 TABLET | Refills: 0 | Status: SHIPPED | OUTPATIENT
Start: 2019-07-16 | End: 2022-03-24 | Stop reason: ALTCHOICE

## 2019-07-22 RX ORDER — SERTRALINE HYDROCHLORIDE 100 MG/1
TABLET, FILM COATED ORAL
Qty: 90 TABLET | Refills: 0 | Status: SHIPPED | OUTPATIENT
Start: 2019-07-22 | End: 2019-10-24 | Stop reason: SDUPTHER

## 2019-07-22 RX ORDER — HYDROCHLOROTHIAZIDE 25 MG/1
TABLET ORAL
Qty: 90 TABLET | Refills: 0 | Status: SHIPPED | OUTPATIENT
Start: 2019-07-22 | End: 2020-01-10

## 2019-07-22 RX ORDER — PANTOPRAZOLE SODIUM 40 MG/1
TABLET, DELAYED RELEASE ORAL
Qty: 90 TABLET | Refills: 0 | Status: SHIPPED | OUTPATIENT
Start: 2019-07-22 | End: 2020-01-10

## 2019-10-24 RX ORDER — SERTRALINE HYDROCHLORIDE 100 MG/1
TABLET, FILM COATED ORAL
Qty: 90 TABLET | Refills: 0 | Status: SHIPPED | OUTPATIENT
Start: 2019-10-24 | End: 2020-01-14 | Stop reason: SDUPTHER

## 2020-01-14 ENCOUNTER — OFFICE VISIT (OUTPATIENT)
Dept: FAMILY MEDICINE CLINIC | Age: 45
End: 2020-01-14
Payer: COMMERCIAL

## 2020-01-14 VITALS
DIASTOLIC BLOOD PRESSURE: 70 MMHG | OXYGEN SATURATION: 98 % | HEART RATE: 90 BPM | SYSTOLIC BLOOD PRESSURE: 112 MMHG | TEMPERATURE: 98.1 F | BODY MASS INDEX: 29.06 KG/M2 | WEIGHT: 164 LBS | HEIGHT: 63 IN

## 2020-01-14 PROCEDURE — 99214 OFFICE O/P EST MOD 30 MIN: CPT | Performed by: NURSE PRACTITIONER

## 2020-01-14 RX ORDER — HYDROCHLOROTHIAZIDE 25 MG/1
TABLET ORAL
Qty: 90 TABLET | Refills: 1 | Status: CANCELLED | OUTPATIENT
Start: 2020-01-14

## 2020-01-14 RX ORDER — OXYBUTYNIN CHLORIDE 10 MG/1
10 TABLET, EXTENDED RELEASE ORAL DAILY
Qty: 30 TABLET | Refills: 2 | Status: SHIPPED | OUTPATIENT
Start: 2020-01-14 | End: 2020-05-19

## 2020-01-14 RX ORDER — SERTRALINE HYDROCHLORIDE 100 MG/1
TABLET, FILM COATED ORAL
Qty: 90 TABLET | Refills: 1 | Status: SHIPPED | OUTPATIENT
Start: 2020-01-14 | End: 2020-04-01

## 2020-01-14 RX ORDER — PANTOPRAZOLE SODIUM 40 MG/1
TABLET, DELAYED RELEASE ORAL
Qty: 90 TABLET | Refills: 1 | Status: SHIPPED | OUTPATIENT
Start: 2020-01-14 | End: 2020-08-02

## 2020-01-14 RX ORDER — VARENICLINE TARTRATE 25 MG
KIT ORAL
Qty: 53 TABLET | Refills: 0 | Status: SHIPPED | OUTPATIENT
Start: 2020-01-14 | End: 2022-03-24 | Stop reason: ALTCHOICE

## 2020-01-14 SDOH — ECONOMIC STABILITY: FOOD INSECURITY: WITHIN THE PAST 12 MONTHS, YOU WORRIED THAT YOUR FOOD WOULD RUN OUT BEFORE YOU GOT MONEY TO BUY MORE.: NEVER TRUE

## 2020-01-14 SDOH — ECONOMIC STABILITY: FOOD INSECURITY: WITHIN THE PAST 12 MONTHS, THE FOOD YOU BOUGHT JUST DIDN'T LAST AND YOU DIDN'T HAVE MONEY TO GET MORE.: NEVER TRUE

## 2020-01-14 SDOH — ECONOMIC STABILITY: INCOME INSECURITY: HOW HARD IS IT FOR YOU TO PAY FOR THE VERY BASICS LIKE FOOD, HOUSING, MEDICAL CARE, AND HEATING?: NOT HARD AT ALL

## 2020-01-14 ASSESSMENT — ENCOUNTER SYMPTOMS
DIARRHEA: 0
SHORTNESS OF BREATH: 0
HEARTBURN: 0
ABDOMINAL PAIN: 0
ORTHOPNEA: 0
VOMITING: 0
COUGH: 0
CONSTIPATION: 0
BACK PAIN: 0
BLOOD IN STOOL: 0
NAUSEA: 0
SORE THROAT: 0

## 2020-01-14 ASSESSMENT — PATIENT HEALTH QUESTIONNAIRE - PHQ9
1. LITTLE INTEREST OR PLEASURE IN DOING THINGS: 0
SUM OF ALL RESPONSES TO PHQ9 QUESTIONS 1 & 2: 0
SUM OF ALL RESPONSES TO PHQ QUESTIONS 1-9: 0
2. FEELING DOWN, DEPRESSED OR HOPELESS: 0
SUM OF ALL RESPONSES TO PHQ QUESTIONS 1-9: 0

## 2020-01-14 NOTE — PROGRESS NOTES
symptoms. The treatment provided moderate relief. Incontinence  Pt has had some dribbling of urine that started about 2 years ago. Symptoms used to be minor and was able to stay the flow with a panty liner. About 1 months ago, symptoms increased. Pt has been at work and gotten what felt like a \"spasm\" and urinated in her pants. Pt states that when she feels the urge she does not have time to make it to the bathroom and will completely empty her bladder, not just a little urine. Pt states \"I can't stop it once it starts. Pt also states that she thinks she does not empty her bladder completely. Also will pee her pants when sneezing or coughing. Started back smoking while in college. Would like to quit again. Did well with chantix last time. Just found out that mother has breast cancer. Pt has had an abnormal mammogram in the past. Would like to get screened.   Past Medical History:     Past Medical History:   Diagnosis Date    Anxiety     Asthma     Bronchitis     Chronic back pain     Depression     Diverticulosis     Eczema     Fractures     history of broken toe    GERD (gastroesophageal reflux disease)     Hemorrhoids     Hiatal hernia     Hiatal hernia     History of bleeding ulcers     Hives     Migraine       Reviewed all health maintenance requirements and ordered appropriate tests  Health Maintenance Due   Topic Date Due    DTaP/Tdap/Td vaccine (1 - Tdap) 02/24/1984    HIV screen  02/24/1988    Diabetes screen  02/24/2019    Potassium monitoring  08/09/2019    Creatinine monitoring  08/09/2019       Past Surgical History:     Past Surgical History:   Procedure Laterality Date    COLONOSCOPY  2012    Dr. Raghav Hawk OFFICE/OUTPT VISIT,PROCEDURE ONLY N/A 8/14/2018    DILATATION AND CURETTAGE HYSTEROSCOPY NOVASURE ABLATION, & Polypectomy performed by Giovani Mo MD at [address] [facility] OFFICE/OUTPT [address] [place] [address] N/A 8/16/2018 COLONOSCOPY, Sigmoid Diverticulosis performed by Sharmin March MD at [address] [place] [address] ENDOSCOPY      UPPER GASTROINTESTINAL ENDOSCOPY N/A 8/16/2018    EGD BIOPSY, Mild Gastritis performed by Sharmin March MD at [facility] OR        Medications:       Prior to Admission medications    Medication Sig Start Date End Date Taking? Authorizing Provider   hydrochlorothiazide (HYDRODIURIL) 25 MG tablet TAKE 1 TABLET BY MOUTH ONE TIME A DAY  7/16/19  Yes IRVING Madden - CNP   pantoprazole (PROTONIX) 40 MG tablet TAKE 1 TABLET BY MOUTH IN THE MORNING ON EMPTY STOMACH 7/16/19  Yes AllisonIRVING Boudreaux - CNP   sertraline (ZOLOFT) 100 MG tablet TAKE 1 TABLET BY MOUTH ONE TIME A DAY  7/16/19  Yes IRVING Madden - CNP   ibuprofen (ADVIL;MOTRIN) 600 MG tablet Take 600 mg by mouth every 6 hours as needed for Pain. Yes Historical Provider, MD        Allergies:       Seasonal    Social History:     Tobacco:    reports that she quit smoking about 2 years ago. Her smoking use included cigarettes. She has a 28.00 pack-year smoking history. She has never used smokeless tobacco.  Alcohol:      reports no history of alcohol use. Drug Use:  reports no history of drug use. Family History:        Family History   Problem Relation Age of Onset    Cancer Maternal Grandfather         Bladder Cancer    Cancer Paternal Grandfather         Lung Cancer       Review of Systems:         Review of Systems   Constitutional: Negative for appetite change, chills and fever. HENT: Negative for sore throat. Respiratory: Negative for cough and shortness of breath. Cardiovascular: Negative for chest pain, palpitations, orthopnea and leg swelling. Gastrointestinal: Negative for abdominal pain, blood in stool, constipation, diarrhea, heartburn, nausea and vomiting. Genitourinary: Positive for difficulty urinating (incomplete emptying) and dysuria.  Negative for frequency and Speech normal.         Behavior: Behavior normal.         Thought Content: Thought content normal.         Judgment: Judgment normal.               Data:     Lab Results   Component Value Date     08/09/2018    K 3.9 08/09/2018     08/09/2018    CO2 25 08/09/2018    BUN 13 08/09/2018    CREATININE 0.75 08/09/2018    GLUCOSE 117 08/09/2018    GLUCOSE 111 04/26/2012    PROT 6.8 08/09/2018    LABALBU 4.4 08/09/2018    LABALBU 4.2 04/19/2012    BILITOT <0.10 08/09/2018    ALKPHOS 102 08/09/2018    AST 18 08/09/2018    ALT 20 08/09/2018     Lab Results   Component Value Date    WBC 7.7 01/03/2019    RBC 4.49 01/03/2019    RBC 4.47 04/26/2012    HGB 13.6 01/03/2019    HCT 40.5 01/03/2019    MCV 90.2 01/03/2019    MCH 30.2 01/03/2019    MCHC 33.5 01/03/2019    RDW 13.4 01/03/2019     01/03/2019     04/26/2012    MPV NOT REPORTED 01/03/2019     Lab Results   Component Value Date    TSH 2.56 05/17/2018     Lab Results   Component Value Date    CHOL 287 01/03/2019    CHOL 297 05/17/2018    HDL 43 01/03/2019    LABA1C 5.6 02/24/2016          Assessment & Plan        Diagnosis Orders   1. Essential hypertension  --BP controlled at this time. Pt has been off and on with taking her HCTZ. Will hold HCTZ for now and monitor BP, especially in light of her urinary incontinence. 2. NALINI (generalized anxiety disorder)  --Well-controlled on Zoloft. Continue same medication. Patient denies suicidal ideation at this time, however if patient does become suicidal, stop medication and call 911.      3. Gastroesophageal reflux disease without esophagitis  --Well-controlled with PPI. Continue to identify and avoid food triggers. 4. Mixed stress and urge urinary incontinence  --We will start on oxybutynin 10 mg p.o. Patient educated about medication. Patient educated about \"squeeze before you sneeze\" and \"get up and go\".     5. History of abnormal mammogram  --Patient has not had an abnormal mammogram in the

## 2020-02-03 ENCOUNTER — TELEPHONE (OUTPATIENT)
Dept: FAMILY MEDICINE CLINIC | Age: 45
End: 2020-02-03

## 2020-02-10 ENCOUNTER — HOSPITAL ENCOUNTER (OUTPATIENT)
Dept: MAMMOGRAPHY | Age: 45
Discharge: HOME | End: 2020-02-12
Payer: COMMERCIAL

## 2020-02-10 PROCEDURE — 77067 SCR MAMMO BI INCL CAD: CPT

## 2020-02-11 ENCOUNTER — TELEPHONE (OUTPATIENT)
Dept: FAMILY MEDICINE CLINIC | Age: 45
End: 2020-02-11

## 2020-02-11 NOTE — TELEPHONE ENCOUNTER
----- Message from Titi Betancourt MD sent at 2/11/2020  3:18 PM EST -----  Please tell pt that she has an abnormal mammogram on the Lt breast and so schedule pt for More Lt breast images.

## 2020-02-20 ENCOUNTER — TELEPHONE (OUTPATIENT)
Dept: FAMILY MEDICINE CLINIC | Age: 45
End: 2020-02-20

## 2020-02-20 ENCOUNTER — HOSPITAL ENCOUNTER (OUTPATIENT)
Dept: ULTRASOUND IMAGING | Age: 45
Discharge: HOME | End: 2020-02-22
Payer: COMMERCIAL

## 2020-02-20 ENCOUNTER — HOSPITAL ENCOUNTER (OUTPATIENT)
Dept: MAMMOGRAPHY | Age: 45
Discharge: HOME | End: 2020-02-22
Payer: COMMERCIAL

## 2020-02-20 ENCOUNTER — HOSPITAL ENCOUNTER (OUTPATIENT)
Dept: VASCULAR LAB | Age: 45
Discharge: HOME | End: 2020-02-22
Payer: COMMERCIAL

## 2020-02-20 PROCEDURE — 77065 DX MAMMO INCL CAD UNI: CPT

## 2020-02-20 PROCEDURE — 76641 ULTRASOUND BREAST COMPLETE: CPT

## 2020-04-01 RX ORDER — SERTRALINE HYDROCHLORIDE 100 MG/1
TABLET, FILM COATED ORAL
Qty: 90 TABLET | Refills: 0 | Status: SHIPPED | OUTPATIENT
Start: 2020-04-01 | End: 2020-04-06

## 2020-04-06 RX ORDER — SERTRALINE HYDROCHLORIDE 100 MG/1
TABLET, FILM COATED ORAL
Qty: 90 TABLET | Refills: 0 | Status: SHIPPED | OUTPATIENT
Start: 2020-04-06 | End: 2020-10-21

## 2020-05-19 ENCOUNTER — OFFICE VISIT (OUTPATIENT)
Dept: FAMILY MEDICINE CLINIC | Age: 45
End: 2020-05-19
Payer: COMMERCIAL

## 2020-05-19 ENCOUNTER — HOSPITAL ENCOUNTER (OUTPATIENT)
Age: 45
Setting detail: SPECIMEN
Discharge: HOME | End: 2020-05-19
Payer: COMMERCIAL

## 2020-05-19 VITALS
SYSTOLIC BLOOD PRESSURE: 112 MMHG | HEART RATE: 74 BPM | DIASTOLIC BLOOD PRESSURE: 82 MMHG | BODY MASS INDEX: 27.46 KG/M2 | OXYGEN SATURATION: 98 % | TEMPERATURE: 97.9 F | HEIGHT: 63 IN | WEIGHT: 155 LBS

## 2020-05-19 LAB
BILIRUBIN, POC: ABNORMAL
BLOOD URINE, POC: ABNORMAL
CLARITY, POC: CLEAR
COLOR, POC: ABNORMAL
GLUCOSE URINE, POC: ABNORMAL
KETONES, POC: ABNORMAL
LEUKOCYTE EST, POC: ABNORMAL
NITRITE, POC: POSITIVE
PH, POC: 5
PROTEIN, POC: ABNORMAL
SPECIFIC GRAVITY, POC: 1.01
UROBILINOGEN, POC: 0.2

## 2020-05-19 PROCEDURE — 87088 URINE BACTERIA CULTURE: CPT

## 2020-05-19 PROCEDURE — 81002 URINALYSIS NONAUTO W/O SCOPE: CPT | Performed by: NURSE PRACTITIONER

## 2020-05-19 PROCEDURE — 99213 OFFICE O/P EST LOW 20 MIN: CPT | Performed by: NURSE PRACTITIONER

## 2020-05-19 PROCEDURE — 87086 URINE CULTURE/COLONY COUNT: CPT

## 2020-05-19 PROCEDURE — 87186 SC STD MICRODIL/AGAR DIL: CPT

## 2020-05-19 RX ORDER — SULFAMETHOXAZOLE AND TRIMETHOPRIM 800; 160 MG/1; MG/1
1 TABLET ORAL 2 TIMES DAILY
Qty: 14 TABLET | Refills: 0 | Status: SHIPPED | OUTPATIENT
Start: 2020-05-19 | End: 2020-05-26

## 2020-05-19 RX ORDER — OXYBUTYNIN CHLORIDE 10 MG/1
TABLET, EXTENDED RELEASE ORAL
Qty: 30 TABLET | Refills: 2 | Status: SHIPPED | OUTPATIENT
Start: 2020-05-19 | End: 2020-09-01

## 2020-05-19 NOTE — TELEPHONE ENCOUNTER
Last OV today for UTI  Last OV for stress incontinence was 1/14/20  Requesting refill on ditropan thru [facility]

## 2020-05-19 NOTE — PROGRESS NOTES
HPI Notes    Name: Letty Ramirez  : 1975         Chief Complaint:     Chief Complaint   Patient presents with    Urinary Tract Infection     Patient here today with pain on urination, burning. Started last week. Took Azo and cranberry supplement. History of Present Illness:        Urinary Tract Infection    This is a new problem. The current episode started in the past 7 days (last Wednesday). The problem occurs every urination. The quality of the pain is described as aching and burning. The pain is moderate. There has been no fever (99.5). The fever has been present for less than 1 day. There is no history of pyelonephritis. Associated symptoms include frequency, hematuria, sweats and urgency. Pertinent negatives include no chills. She has tried increased fluids (azo, cranberry supplement) for the symptoms.        Past Medical History:     Past Medical History:   Diagnosis Date    Anxiety     Asthma     Bronchitis     Chronic back pain     Depression     Diverticulosis     Eczema     Fractures     history of broken toe    GERD (gastroesophageal reflux disease)     Hemorrhoids     Hiatal hernia     Hiatal hernia     History of bleeding ulcers     Hives     Migraine       Reviewed all health maintenance requirements and ordered appropriate tests  Health Maintenance Due   Topic Date Due    HIV screen  1988    DTaP/Tdap/Td vaccine (1 - Tdap) 1992    Diabetes screen  2019    Potassium monitoring  2019    Creatinine monitoring  2019       Past Surgical History:     Past Surgical History:   Procedure Laterality Date    COLONOSCOPY      Dr. Mayo Nair OFFICE/OUTPT VISIT,PROCEDURE ONLY N/A 2018    DILATATION AND CURETTAGE HYSTEROSCOPY NOVASURE ABLATION, & Polypectomy performed by Radha Milligan MD at [address] [facility] OFFICE/OUTPT [address] [place] [address] N/A 2018    COLONOSCOPY, Sigmoid Diverticulosis performed by Klarissa Perez MD at [address] [place] [address] ENDOSCOPY      UPPER GASTROINTESTINAL ENDOSCOPY N/A 8/16/2018    EGD BIOPSY, Mild Gastritis performed by Klarissa Perez MD at [facility] OR        Medications:       Prior to Admission medications    Medication Sig Start Date End Date Taking? Authorizing Provider   sulfamethoxazole-trimethoprim (BACTRIM DS;SEPTRA DS) 800-160 MG per tablet Take 1 tablet by mouth 2 times daily for 7 days 5/19/20 5/26/20 Yes Cole Fairly, APRN - CNP   sertraline (ZOLOFT) 100 MG tablet TAKE 1 TABLET BY MOUTH ONE TIME A DAY  4/6/20  Yes Cole Fairly, APRN - CNP   pantoprazole (PROTONIX) 40 MG tablet TAKE 1 TABLET BY MOUTH IN THE MORNING ON EMPTY STOMACH 1/14/20  Yes Cole Fairly, APRN - CNP   oxybutynin (DITROPAN XL) 10 MG extended release tablet Take 1 tablet by mouth daily 1/14/20  Yes Cole Fairly, APRN - CNP   varenicline (CHANTIX STARTING MONTH PAK) 0.5 MG X 11 & 1 MG X 42 tablet Take 0.5mg by mouth daily first 3 days. Then take 0.5mg by mouth twice daily for 4 days. After first week, take 1mg by mouth twice daily. Patient not taking: Reported on 5/19/2020 1/14/20   Cole Fairly, APRN - CNP   hydrochlorothiazide (HYDRODIURIL) 25 MG tablet TAKE 1 TABLET BY MOUTH ONE TIME A DAY   Patient not taking: Reported on 5/19/2020 7/16/19   Cole Fairly, APRN - CNP   ibuprofen (ADVIL;MOTRIN) 600 MG tablet Take 600 mg by mouth every 6 hours as needed for Pain. Historical Provider, MD        Allergies:       Seasonal    Social History:     Tobacco:    reports that she quit smoking about 2 years ago. Her smoking use included cigarettes. She has a 28.00 pack-year smoking history. She has never used smokeless tobacco.  Alcohol:      reports no history of alcohol use. Drug Use:  reports no history of drug use.     Family History:        Family History   Problem Relation Age of Onset    Cancer Maternal Grandfather [address]        Electronically signed by IRVING Black CNP on 5/19/2020 at 10:00 AM           Completed Refills      Requested Prescriptions     Signed Prescriptions Disp Refills    sulfamethoxazole-trimethoprim (BACTRIM DS;SEPTRA DS) 800-160 MG per tablet 14 tablet 0     Sig: Take 1 tablet by mouth 2 times daily for 7 days         Katharine received counseling on the following healthy behaviors: nutrition and medication adherence  Reviewed prior labs and health maintenance. Continue current medications, diet and exercise. Discussed use, benefit, and side effects of prescribed medications. Barriers to medication compliance addressed. Patient given educational materials - see patient instructions. All patient questions answered. Patient voiced understanding.

## 2020-05-19 NOTE — PATIENT INSTRUCTIONS
SURVEY:    You may be receiving a survey from [employer] regarding your visit today. Please complete the survey to enable us to provide the highest quality of care to you and your family. If you cannot score us a very good (5 Stars) on any question, please call the office to discuss how we could have made your experience a very good one. Thank you.     Clinical Care Team: Nidia Iyer, IRVING-JEREMIE Dias LPN    Clerical Team: Vasu Tang

## 2020-05-20 LAB
CULTURE: ABNORMAL
Lab: ABNORMAL
SPECIMEN DESCRIPTION: ABNORMAL

## 2020-08-02 RX ORDER — PANTOPRAZOLE SODIUM 40 MG/1
TABLET, DELAYED RELEASE ORAL
Qty: 90 TABLET | Refills: 1 | Status: SHIPPED | OUTPATIENT
Start: 2020-08-02 | End: 2021-02-17

## 2020-09-01 RX ORDER — OXYBUTYNIN CHLORIDE 10 MG/1
TABLET, EXTENDED RELEASE ORAL
Qty: 30 TABLET | Refills: 2 | Status: SHIPPED | OUTPATIENT
Start: 2020-09-01 | End: 2020-11-23

## 2020-11-23 RX ORDER — OXYBUTYNIN CHLORIDE 10 MG/1
TABLET, EXTENDED RELEASE ORAL
Qty: 30 TABLET | Refills: 2 | Status: SHIPPED | OUTPATIENT
Start: 2020-11-23 | End: 2021-03-25

## 2020-12-19 NOTE — BRIEF OP NOTE
Brief Postoperative Note  ______________________________________________________________    Patient: [place] Sethi  YOB: 1975  MRN: [id number]  Date of Procedure: 8/16/2018    Pre-Op Diagnosis:      1. History peptic ulcer disease     2. Screening colonoscopy    Post-Op Diagnosis:      1. Minimal gastritis     2. Sigmoid diverticulosis       Procedure(s):      1. EGD     2. Antral biopsies     3. Colonoscopy anus to cecum    Anesthesia: Anesthesia type not filed in the log. Surgeon(s):  Meaghan Henson MD    Staff:  Sandra Pleitez Person First: Beatrice Cedeño  Scrub Person Second: Niharika Justice     Estimated Blood Loss:   Less than 01RY    Complications: None    Specimens:      1. Antral biopsies    Findings:   As above.     Dictated # [id number]    Meaghan Henson MD  Date: 8/16/2018  Time: 8:31 AM .

## 2021-02-17 RX ORDER — PANTOPRAZOLE SODIUM 40 MG/1
TABLET, DELAYED RELEASE ORAL
Qty: 90 TABLET | Refills: 1 | Status: SHIPPED | OUTPATIENT
Start: 2021-02-17 | End: 2022-03-24 | Stop reason: SDUPTHER

## 2021-02-17 NOTE — TELEPHONE ENCOUNTER
Last visit:  5/19/2020  Next Visit Date:  No future appointments. Last Med refill:    Medication List:  Prior to Admission medications    Medication Sig Start Date End Date Taking? Authorizing Provider   oxybutynin (DITROPAN-XL) 10 MG extended release tablet TAKE 1 TABLET BY MOUTH EVERY DAY 11/23/20   IRVING Eagle CNP   sertraline (ZOLOFT) 100 MG tablet TAKE 1 TABLET BY MOUTH EVERY DAY 10/21/20   IRVING Eagle - CNP   pantoprazole (PROTONIX) 40 MG tablet TAKE 1 TABLET BY MOUTH EVERY DAY IN THE MORNING on empty stomach 8/2/20   IRVING Eagle - CNP   varenicline (CHANTIX STARTING MONTH PAK) 0.5 MG X 11 & 1 MG X 42 tablet Take 0.5mg by mouth daily first 3 days. Then take 0.5mg by mouth twice daily for 4 days. After first week, take 1mg by mouth twice daily. Patient not taking: Reported on 5/19/2020 1/14/20   IRVING Eagle CNP   hydrochlorothiazide (HYDRODIURIL) 25 MG tablet TAKE 1 TABLET BY MOUTH ONE TIME A DAY   Patient not taking: Reported on 5/19/2020 7/16/19   IRVING Eagle CNP   ibuprofen (ADVIL;MOTRIN) 600 MG tablet Take 600 mg by mouth every 6 hours as needed for Pain.     Historical Provider, MD       Allergies:  Seasonal    Hemoglobin A1C (%)   Date Value   02/24/2016 5.6             ( goal A1C is < 7)   No results found for: LABMICR  LDL Cholesterol (mg/dL)   Date Value   01/03/2019 197 (H)     LDL Calculated (mg/dL)   Date Value   05/17/2018 205 (A)       (goal LDL is <100)   AST (U/L)   Date Value   08/09/2018 18     ALT (U/L)   Date Value   08/09/2018 20     BUN (mg/dL)   Date Value   08/09/2018 13     BP Readings from Last 3 Encounters:   05/19/20 112/82   01/14/20 112/70   02/26/19 122/80          (goal 120/80)

## 2021-06-03 RX ORDER — SERTRALINE HYDROCHLORIDE 100 MG/1
TABLET, FILM COATED ORAL
Qty: 90 TABLET | Refills: 1 | Status: SHIPPED | OUTPATIENT
Start: 2021-06-03 | End: 2022-03-24 | Stop reason: SDUPTHER

## 2021-06-03 NOTE — TELEPHONE ENCOUNTER
Last OV: 5/19/2020 UTI mental health 01/14/2020  Last RX:   Next scheduled apt: Visit date not found      lvm to return call to schedule next appt     RX pending

## 2022-03-24 ENCOUNTER — OFFICE VISIT (OUTPATIENT)
Dept: FAMILY MEDICINE CLINIC | Age: 47
End: 2022-03-24
Payer: COMMERCIAL

## 2022-03-24 VITALS
DIASTOLIC BLOOD PRESSURE: 73 MMHG | TEMPERATURE: 98.1 F | HEIGHT: 63 IN | OXYGEN SATURATION: 98 % | HEART RATE: 90 BPM | SYSTOLIC BLOOD PRESSURE: 120 MMHG | WEIGHT: 149 LBS | BODY MASS INDEX: 26.4 KG/M2

## 2022-03-24 DIAGNOSIS — F41.1 GAD (GENERALIZED ANXIETY DISORDER): Primary | ICD-10-CM

## 2022-03-24 DIAGNOSIS — K21.9 GASTROESOPHAGEAL REFLUX DISEASE WITHOUT ESOPHAGITIS: ICD-10-CM

## 2022-03-24 PROCEDURE — 99213 OFFICE O/P EST LOW 20 MIN: CPT | Performed by: NURSE PRACTITIONER

## 2022-03-24 RX ORDER — PANTOPRAZOLE SODIUM 40 MG/1
TABLET, DELAYED RELEASE ORAL
Qty: 90 TABLET | Refills: 1 | Status: SHIPPED | OUTPATIENT
Start: 2022-03-24 | End: 2022-08-23 | Stop reason: SDUPTHER

## 2022-03-24 SDOH — ECONOMIC STABILITY: FOOD INSECURITY: WITHIN THE PAST 12 MONTHS, THE FOOD YOU BOUGHT JUST DIDN'T LAST AND YOU DIDN'T HAVE MONEY TO GET MORE.: NEVER TRUE

## 2022-03-24 SDOH — ECONOMIC STABILITY: FOOD INSECURITY: WITHIN THE PAST 12 MONTHS, YOU WORRIED THAT YOUR FOOD WOULD RUN OUT BEFORE YOU GOT MONEY TO BUY MORE.: NEVER TRUE

## 2022-03-24 ASSESSMENT — ENCOUNTER SYMPTOMS
NAUSEA: 0
SORE THROAT: 0
HEARTBURN: 0
DIARRHEA: 0
SHORTNESS OF BREATH: 0
VOMITING: 0
COUGH: 0

## 2022-03-24 ASSESSMENT — PATIENT HEALTH QUESTIONNAIRE - PHQ9
SUM OF ALL RESPONSES TO PHQ QUESTIONS 1-9: 8
SUM OF ALL RESPONSES TO PHQ QUESTIONS 1-9: 8
6. FEELING BAD ABOUT YOURSELF - OR THAT YOU ARE A FAILURE OR HAVE LET YOURSELF OR YOUR FAMILY DOWN: 0
SUM OF ALL RESPONSES TO PHQ QUESTIONS 1-9: 8
1. LITTLE INTEREST OR PLEASURE IN DOING THINGS: 1
3. TROUBLE FALLING OR STAYING ASLEEP: 0
SUM OF ALL RESPONSES TO PHQ9 QUESTIONS 1 & 2: 4
9. THOUGHTS THAT YOU WOULD BE BETTER OFF DEAD, OR OF HURTING YOURSELF: 0
2. FEELING DOWN, DEPRESSED OR HOPELESS: 3
SUM OF ALL RESPONSES TO PHQ QUESTIONS 1-9: 8
7. TROUBLE CONCENTRATING ON THINGS, SUCH AS READING THE NEWSPAPER OR WATCHING TELEVISION: 0
4. FEELING TIRED OR HAVING LITTLE ENERGY: 0
8. MOVING OR SPEAKING SO SLOWLY THAT OTHER PEOPLE COULD HAVE NOTICED. OR THE OPPOSITE, BEING SO FIGETY OR RESTLESS THAT YOU HAVE BEEN MOVING AROUND A LOT MORE THAN USUAL: 1
10. IF YOU CHECKED OFF ANY PROBLEMS, HOW DIFFICULT HAVE THESE PROBLEMS MADE IT FOR YOU TO DO YOUR WORK, TAKE CARE OF THINGS AT HOME, OR GET ALONG WITH OTHER PEOPLE: 0
5. POOR APPETITE OR OVEREATING: 3

## 2022-03-24 ASSESSMENT — SOCIAL DETERMINANTS OF HEALTH (SDOH): HOW HARD IS IT FOR YOU TO PAY FOR THE VERY BASICS LIKE FOOD, HOUSING, MEDICAL CARE, AND HEATING?: NOT HARD AT ALL

## 2022-08-23 ENCOUNTER — OFFICE VISIT (OUTPATIENT)
Dept: FAMILY MEDICINE CLINIC | Age: 47
End: 2022-08-23
Payer: COMMERCIAL

## 2022-08-23 VITALS
BODY MASS INDEX: 25.86 KG/M2 | WEIGHT: 146 LBS | TEMPERATURE: 97.8 F | SYSTOLIC BLOOD PRESSURE: 130 MMHG | HEART RATE: 84 BPM | OXYGEN SATURATION: 98 % | DIASTOLIC BLOOD PRESSURE: 68 MMHG

## 2022-08-23 DIAGNOSIS — K21.9 GASTROESOPHAGEAL REFLUX DISEASE WITHOUT ESOPHAGITIS: Primary | ICD-10-CM

## 2022-08-23 DIAGNOSIS — G89.29 CHRONIC MIDLINE POSTERIOR NECK PAIN: ICD-10-CM

## 2022-08-23 DIAGNOSIS — F41.1 GAD (GENERALIZED ANXIETY DISORDER): ICD-10-CM

## 2022-08-23 DIAGNOSIS — M54.2 CHRONIC MIDLINE POSTERIOR NECK PAIN: ICD-10-CM

## 2022-08-23 PROCEDURE — 99214 OFFICE O/P EST MOD 30 MIN: CPT | Performed by: NURSE PRACTITIONER

## 2022-08-23 RX ORDER — TIZANIDINE 4 MG/1
4 TABLET ORAL 3 TIMES DAILY PRN
Qty: 90 TABLET | Refills: 2 | Status: SHIPPED | OUTPATIENT
Start: 2022-08-23

## 2022-08-23 RX ORDER — PANTOPRAZOLE SODIUM 40 MG/1
40 TABLET, DELAYED RELEASE ORAL DAILY
Qty: 90 TABLET | Refills: 1 | Status: SHIPPED | OUTPATIENT
Start: 2022-08-23

## 2022-08-23 ASSESSMENT — ENCOUNTER SYMPTOMS
VOMITING: 0
SHORTNESS OF BREATH: 0
DIARRHEA: 0
GLOBUS SENSATION: 0
NAUSEA: 0
HEARTBURN: 0
COUGH: 0

## 2022-08-23 NOTE — PROGRESS NOTES
HPI Notes    Name: Prerna Pinedo  : 1975         Chief Complaint:     Chief Complaint   Patient presents with    Gastroesophageal Reflux     Patient here today for check up    Mental Health Problem       History of Present Illness:        Gastroesophageal Reflux  She reports no chest pain, no coughing, no globus sensation, no heartburn or no nausea. This is a chronic problem. The problem occurs rarely. The problem has been waxing and waning. Pertinent negatives include no fatigue. Risk factors include caffeine use. She has tried a PPI for the symptoms. The treatment provided moderate relief. Mental Health Problem  The primary symptoms include dysphoric mood. The current episode started more than 1 month ago. This is a chronic problem. The onset of the illness is precipitated by emotional stress. The degree of incapacity that she is experiencing as a consequence of her illness is mild. Additional symptoms of the illness do not include insomnia, fatigue, feelings of worthlessness, headaches or seizures. She does not admit to suicidal ideas. She does not have a plan to attempt suicide. She does not contemplate harming herself. She has not already injured self. She does not contemplate injuring another person. She has not already  injured another person. Neck Pain   This is a chronic problem. The current episode started more than 1 year ago. The problem occurs intermittently. The problem has been waxing and waning. The pain is associated with nothing. The pain is present in the midline. The quality of the pain is described as aching. Associated symptoms include numbness (bilat hands in AM, resolves after a short time) and tingling (bilat hands in AM, resolves after a short time). Pertinent negatives include no chest pain, fever or headaches.      Past Medical History:     Past Medical History:   Diagnosis Date    Anxiety     Asthma     Bronchitis     Chronic back pain     Depression     Diverticulosis Eczema     Fractures     history of broken toe    GERD (gastroesophageal reflux disease)     Hemorrhoids     Hiatal hernia     Hiatal hernia     History of bleeding ulcers     Hives     Migraine       Reviewed all health maintenance requirements and ordered appropriate tests  Health Maintenance Due   Topic Date Due    HIV screen  Never done    Hepatitis C screen  Never done    DTaP/Tdap/Td vaccine (1 - Tdap) Never done    Diabetes screen  02/24/2019    Cervical cancer screen  11/20/2020    COVID-19 Vaccine (2 - Pfizer series) 05/03/2021       Past Surgical History:     Past Surgical History:   Procedure Laterality Date    COLONOSCOPY  2012    Dr. Summer Miller OFFICE/OUTPT VISIT,PROCEDURE ONLY N/A 8/14/2018    DILATATION AND CURETTAGE HYSTEROSCOPY NOVASURE ABLATION, & Polypectomy performed by Jimenez Vargas MD at [id number] [place] [address]  OFFICE/OUTPT [facility] [facility] N/A 8/16/2018    COLONOSCOPY, Sigmoid Diverticulosis performed by Raymon Denton MD at [facility] ENDOSCOPY N/A 8/16/2018    EGD BIOPSY, Mild Gastritis performed by Raymon Denton MD at [facility]        Medications:       Prior to Admission medications    Medication Sig Start Date End Date Taking? Authorizing Provider   pantoprazole (PROTONIX) 40 MG tablet Take 1 tablet by mouth daily TAKE 1 TABLET BY MOUTH EVERY DAY IN THE MORNING on an empty stomach 8/23/22  Yes IRVING Hinson CNP   sertraline (ZOLOFT) 50 MG tablet Take 1 tablet by mouth daily 8/23/22  Yes IRVING Hinson CNP   tiZANidine (ZANAFLEX) 4 MG tablet Take 1 tablet by mouth 3 times daily as needed (neck muscle pain) 8/23/22  Yes IRVING Hinson CNP        Allergies:       Seasonal    Social History:     Tobacco:    reports that she quit smoking about 5 years ago. Her smoking use included cigarettes. She has a 28.00 pack-year smoking history. She has never used smokeless tobacco.  Alcohol:      reports no history of alcohol use. Drug Use:  reports no history of drug use. Family History:     Family History   Problem Relation Age of Onset    Cancer Maternal Grandfather         Bladder Cancer    Cancer Paternal Grandfather         Lung Cancer       Review of Systems:         Review of Systems   Constitutional:  Negative for chills, fatigue and fever. Respiratory:  Negative for cough and shortness of breath. Cardiovascular:  Negative for chest pain and palpitations. Gastrointestinal:  Negative for diarrhea, heartburn, nausea and vomiting. Musculoskeletal:  Positive for myalgias and neck pain. Neurological:  Positive for tingling (bilat hands in AM, resolves after a short time) and numbness (bilat hands in AM, resolves after a short time). Negative for dizziness, seizures and headaches. Psychiatric/Behavioral:  Positive for dysphoric mood. The patient does not have insomnia. Physical Exam:     Vitals:  /68   Pulse 84   Temp 97.8 °F (36.6 °C) (Oral)   Wt 146 lb (66.2 kg)   SpO2 98%   BMI 25.86 kg/m²       Physical Exam  Vitals and nursing note reviewed. Constitutional:       Appearance: Normal appearance. She is well-developed. Cardiovascular:      Rate and Rhythm: Normal rate and regular rhythm. Heart sounds: Normal heart sounds, S1 normal and S2 normal.   Pulmonary:      Effort: Pulmonary effort is normal. No respiratory distress. Breath sounds: Normal breath sounds. Abdominal:      General: Bowel sounds are normal.      Palpations: Abdomen is soft. Tenderness: There is no abdominal tenderness. Musculoskeletal:        Arms:       Comments: Muscle tightness to bilat upper trapezius and posterior neck muscles. Skin:     General: Skin is warm and dry. Neurological:      Mental Status: She is alert and oriented to person, place, and time.              Data:     Lab Results   Component Value Date/Time  08/09/2018 03:47 PM    K 3.9 08/09/2018 03:47 PM     08/09/2018 03:47 PM    CO2 25 08/09/2018 03:47 PM    BUN 13 08/09/2018 03:47 PM    CREATININE 0.75 08/09/2018 03:47 PM    GLUCOSE 117 08/09/2018 03:47 PM    GLUCOSE 111 04/26/2012 10:21 AM    PROT 6.8 08/09/2018 03:47 PM    LABALBU 4.4 08/09/2018 03:47 PM    LABALBU 4.2 04/19/2012 10:30 AM    BILITOT <0.10 08/09/2018 03:47 PM    ALKPHOS 102 08/09/2018 03:47 PM    AST 18 08/09/2018 03:47 PM    ALT 20 08/09/2018 03:47 PM     Lab Results   Component Value Date/Time    WBC 7.7 01/03/2019 12:02 PM    RBC 4.49 01/03/2019 12:02 PM    RBC 4.47 04/26/2012 10:21 AM    HGB 13.6 01/03/2019 12:02 PM    HCT 40.5 01/03/2019 12:02 PM    MCV 90.2 01/03/2019 12:02 PM    MCH 30.2 01/03/2019 12:02 PM    MCHC 33.5 01/03/2019 12:02 PM    RDW 13.4 01/03/2019 12:02 PM     01/03/2019 12:02 PM     04/26/2012 10:21 AM    MPV NOT REPORTED 01/03/2019 12:02 PM     Lab Results   Component Value Date/Time    TSH 2.56 05/17/2018 12:00 AM     Lab Results   Component Value Date/Time    CHOL 287 01/03/2019 12:02 PM    CHOL 297 05/17/2018 12:00 AM    HDL 43 01/03/2019 12:02 PM    LABA1C 5.6 02/24/2016 01:38 PM          Assessment & Plan        Diagnosis Orders   1. Gastroesophageal reflux disease without esophagitis   --well controlled with PPI. Continue diet modification. 2. NALINI (generalized anxiety disorder)  --well controlled with sertraline. Continue same medication at current dose. 3. Chronic midline posterior neck pain   --muscle tightness on exam. Will start on tizanidine 4mg TID prn. Patient verbalizes understanding and agreement with plan. All questions answered. If symptoms do not resolve or worsen, return to office.                  Completed Refills   Requested Prescriptions     Signed Prescriptions Disp Refills    pantoprazole (PROTONIX) 40 MG tablet 90 tablet 1     Sig: Take 1 tablet by mouth daily TAKE 1 TABLET BY MOUTH EVERY DAY IN THE MORNING on an empty stomach    sertraline (ZOLOFT) 50 MG tablet 90 tablet 1     Sig: Take 1 tablet by mouth daily    tiZANidine (ZANAFLEX) 4 MG tablet 90 tablet 2     Sig: Take 1 tablet by mouth 3 times daily as needed (neck muscle pain)     No follow-ups on file. Orders Placed This Encounter   Medications    pantoprazole (PROTONIX) 40 MG tablet     Sig: Take 1 tablet by mouth daily TAKE 1 TABLET BY MOUTH EVERY DAY IN THE MORNING on an empty stomach     Dispense:  90 tablet     Refill:  1     We are requesting this refill to have on file for next month s order. sertraline (ZOLOFT) 50 MG tablet     Sig: Take 1 tablet by mouth daily     Dispense:  90 tablet     Refill:  1     We are requesting this refill to have on file for next month s order. tiZANidine (ZANAFLEX) 4 MG tablet     Sig: Take 1 tablet by mouth 3 times daily as needed (neck muscle pain)     Dispense:  90 tablet     Refill:  2     No orders of the defined types were placed in this encounter. Patient Instructions   . SURVEY:    You may be receiving a survey from [employer] regarding your visit today. Please complete the survey to enable us to provide the highest quality of care to you and your family. If you cannot score us a very good (5 Stars) on any question, please call the office to discuss how we could have made your experience a very good one. Thank you.     Clinical Care Team: PEE Mooney LPN    Clerical Team: Dee Chavez   Electronically signed by IRVING Mooney CNP on 8/23/2022 at 11:05 AM           Completed Refills   Requested Prescriptions     Signed Prescriptions Disp Refills    pantoprazole (PROTONIX) 40 MG tablet 90 tablet 1     Sig: Take 1 tablet by mouth daily TAKE 1 TABLET BY MOUTH EVERY DAY IN THE MORNING on an empty stomach    sertraline (ZOLOFT) 50 MG tablet 90 tablet 1     Sig: Take 1 tablet by mouth daily    tiZANidine (ZANAFLEX) 4 MG tablet 90 tablet 2     Sig: Take 1 tablet by mouth 3 times daily as needed (neck muscle pain)

## 2022-08-23 NOTE — PATIENT INSTRUCTIONS
Janeen Fernando SURVEY:    You may be receiving a survey from [employer] regarding your visit today. Please complete the survey to enable us to provide the highest quality of care to you and your family. If you cannot score us a very good (5 Stars) on any question, please call the office to discuss how we could have made your experience a very good one. Thank you.     Clinical Care Team: IRVING Patel-JEREMIE Cooper LPN    Clerical Team: Vasu Chung Antis

## 2022-12-20 NOTE — PROGRESS NOTES
HPI Notes    Name: Susan Bhandari  : 1975         Chief Complaint:     Chief Complaint   Patient presents with   [address] Problem     discuss depression meds. Patient feeling depressed and anxious    Gastroesophageal Reflux       History of Present Illness:        Mental Health Problem  The primary symptoms include dysphoric mood. The current episode started more than 1 month ago. This is a chronic problem. The onset of the illness is precipitated by emotional stress. The degree of incapacity that she is experiencing as a consequence of her illness is mild. Additional symptoms of the illness do not include insomnia, unexpected weight change, feelings of worthlessness, headaches or seizures. She does not admit to suicidal ideas. She does not have a plan to attempt suicide. She does not contemplate harming herself. She has not already injured self. She does not contemplate injuring another person. She has not already  injured another person. Gastroesophageal Reflux  She reports no chest pain, no coughing, no heartburn, no nausea or no sore throat. This is a chronic problem. The current episode started more than 1 year ago. The problem occurs rarely. The problem has been gradually improving. The symptoms are aggravated by certain foods. Risk factors include caffeine use, lack of exercise and smoking/tobacco exposure. She has tried a PPI for the symptoms. The treatment provided moderate relief.        Past Medical History:     Past Medical History:   Diagnosis Date    Anxiety     Asthma     Bronchitis     Chronic back pain     Depression     Diverticulosis     Eczema     Fractures     history of broken toe    GERD (gastroesophageal reflux disease)     Hemorrhoids     Hiatal hernia     Hiatal hernia     History of bleeding ulcers     Hives     Migraine       Reviewed all health maintenance requirements and ordered appropriate tests  Health Maintenance Due   Topic Date Due    Hepatitis C Patient off floor, blood sugar log reviewed, blood sugars are running high.  We will change 70/30 insulin to 24 units subcu 3 times a day half an hour before each meal and follow blood sugars and make adjustments as needed.   screen  Never done    HIV screen  Never done    DTaP/Tdap/Td vaccine (1 - Tdap) Never done    Diabetes screen  02/24/2019    Potassium monitoring  08/09/2019    Creatinine monitoring  08/09/2019    Cervical cancer screen  11/20/2020    Flu vaccine (1) Never done    COVID-19 Vaccine (3 - Booster for Pfizer series) 11/09/2021       Past Surgical History:     Past Surgical History:   Procedure Laterality Date    COLONOSCOPY  2012    Dr. Margaret Mcfadden OFFICE/OUTPT VISIT,PROCEDURE ONLY N/A 8/14/2018    DILATATION AND CURETTAGE HYSTEROSCOPY NOVASURE ABLATION, & Polypectomy performed by Dwain Cline MD at [address] [facility] OFFICE/OUTPT [facility] N/A 8/16/2018    COLONOSCOPY, Sigmoid Diverticulosis performed by Juan J Beckwith MD at [address] [facility] ENDOSCOPY N/A 8/16/2018    EGD BIOPSY, Mild Gastritis performed by Juan J Beckwith MD at [facility]        Medications:       Prior to Admission medications    Medication Sig Start Date End Date Taking? Authorizing Provider   pantoprazole (PROTONIX) 40 MG tablet TAKE 1 TABLET BY MOUTH EVERY DAY IN THE MORNING on an empty stomach 3/24/22  Yes IRVING Odonnell CNP   sertraline (ZOLOFT) 50 MG tablet Take 1 tablet by mouth daily 3/24/22  Yes IRVING Odonnell CNP        Allergies:       Seasonal    Social History:     Tobacco:    reports that she quit smoking about 4 years ago. Her smoking use included cigarettes. She has a 28.00 pack-year smoking history. She has never used smokeless tobacco.  Alcohol:      reports no history of alcohol use. Drug Use:  reports no history of drug use.     Family History:        Family History   Problem Relation Age of Onset    Cancer Maternal Grandfather         Bladder Cancer    Cancer Paternal Grandfather         Lung Cancer       Review of Systems:         Review of Systems Constitutional: Negative for chills, fever and unexpected weight change. HENT: Negative for sore throat. Respiratory: Negative for cough and shortness of breath. Cardiovascular: Negative for chest pain and palpitations. Gastrointestinal: Negative for diarrhea, heartburn, nausea and vomiting. Neurological: Negative for dizziness, seizures and headaches. Psychiatric/Behavioral: Positive for dysphoric mood. The patient does not have insomnia. Physical Exam:     Vitals:  /73   Pulse 90   Temp 98.1 °F (36.7 °C)   Ht 5' 3\" (1.6 m)   Wt 149 lb (67.6 kg)   SpO2 98%   BMI 26.39 kg/m²       Physical Exam  Vitals and nursing note reviewed. Constitutional:       Appearance: Normal appearance. She is well-developed. Cardiovascular:      Rate and Rhythm: Normal rate and regular rhythm. Heart sounds: Normal heart sounds, S1 normal and S2 normal.   Pulmonary:      Effort: Pulmonary effort is normal. No respiratory distress. Breath sounds: Normal breath sounds. Abdominal:      General: Bowel sounds are normal.      Palpations: Abdomen is soft. Tenderness: There is no abdominal tenderness. Skin:     General: Skin is warm and dry. Neurological:      Mental Status: She is alert and oriented to person, place, and time.                Data:     Lab Results   Component Value Date     08/09/2018    K 3.9 08/09/2018     08/09/2018    CO2 25 08/09/2018    BUN 13 08/09/2018    CREATININE 0.75 08/09/2018    GLUCOSE 117 08/09/2018    GLUCOSE 111 04/26/2012    PROT 6.8 08/09/2018    LABALBU 4.4 08/09/2018    LABALBU 4.2 04/19/2012    BILITOT <0.10 08/09/2018    ALKPHOS 102 08/09/2018    AST 18 08/09/2018    ALT 20 08/09/2018     Lab Results   Component Value Date    WBC 7.7 01/03/2019    RBC 4.49 01/03/2019    RBC 4.47 04/26/2012    HGB 13.6 01/03/2019    HCT 40.5 01/03/2019    MCV 90.2 01/03/2019    MCH 30.2 01/03/2019    MCHC 33.5 01/03/2019    RDW 13.4 01/03/2019    PLT 468 01/03/2019     04/26/2012    MPV NOT REPORTED 01/03/2019     Lab Results   Component Value Date    TSH 2.56 05/17/2018     Lab Results   Component Value Date    CHOL 287 01/03/2019    CHOL 297 05/17/2018    HDL 43 01/03/2019    LABA1C 5.6 02/24/2016          Assessment & Plan        Diagnosis Orders   1. NALINI (generalized anxiety disorder)  ---was doing well while on zoloft. Will resume zoloft. Pt will titrate up to 50mg by taking half tab x 4 days and then whole tab. 2. Gastroesophageal reflux disease without esophagitis   --will resume pantoprazole 40mg daily. Pt educated to modify diet. Patient verbalizes understanding and agreement with plan. All questions answered. If symptoms do not resolve or worsen, return to office. Completed Refills   Requested Prescriptions     Signed Prescriptions Disp Refills    pantoprazole (PROTONIX) 40 MG tablet 90 tablet 1     Sig: TAKE 1 TABLET BY MOUTH EVERY DAY IN THE MORNING on an empty stomach    sertraline (ZOLOFT) 50 MG tablet 90 tablet 1     Sig: Take 1 tablet by mouth daily     No follow-ups on file. Orders Placed This Encounter   Medications    pantoprazole (PROTONIX) 40 MG tablet     Sig: TAKE 1 TABLET BY MOUTH EVERY DAY IN THE MORNING on an empty stomach     Dispense:  90 tablet     Refill:  1     We are requesting this refill to have on file for next month s order.  sertraline (ZOLOFT) 50 MG tablet     Sig: Take 1 tablet by mouth daily     Dispense:  90 tablet     Refill:  1     We are requesting this refill to have on file for next month s order. No orders of the defined types were placed in this encounter. There are no Patient Instructions on file for this visit.     Electronically signed by IRVING Odonnell CNP on 3/24/2022 at 8:28 AM           Completed Refills      Requested Prescriptions     Signed Prescriptions Disp Refills    pantoprazole (PROTONIX) 40 MG tablet 90 tablet 1     Sig: TAKE 1 TABLET BY MOUTH EVERY DAY IN THE MORNING on an empty stomach    sertraline (ZOLOFT) 50 MG tablet 90 tablet 1     Sig: Take 1 tablet by mouth daily         Katharine received counseling on the following healthy behaviors: nutrition, exercise and medication adherence  Reviewed prior labs and health maintenance. Continue current medications, diet and exercise. Discussed use, benefit, and side effects of prescribed medications. Barriers to medication compliance addressed. Patient given educational materials - see patient instructions. All patient questions answered. Patient voiced understanding.

## 2023-02-24 NOTE — TELEPHONE ENCOUNTER
Last OV: 8/23/2022  chronic   Last RX:    Next scheduled apt: Visit date not found        Surescript requesting a refill

## 2023-05-20 ENCOUNTER — APPOINTMENT (OUTPATIENT)
Dept: GENERAL RADIOLOGY | Age: 48
End: 2023-05-20
Payer: COMMERCIAL

## 2023-05-20 ENCOUNTER — HOSPITAL ENCOUNTER (EMERGENCY)
Age: 48
Discharge: HOME | End: 2023-05-20
Attending: FAMILY MEDICINE
Payer: COMMERCIAL

## 2023-05-20 VITALS
DIASTOLIC BLOOD PRESSURE: 108 MMHG | SYSTOLIC BLOOD PRESSURE: 175 MMHG | RESPIRATION RATE: 16 BRPM | OXYGEN SATURATION: 100 % | WEIGHT: 150.8 LBS | HEART RATE: 75 BPM | BODY MASS INDEX: 26.71 KG/M2

## 2023-05-20 DIAGNOSIS — M54.10 ACUTE LOW BACK PAIN WITH RADICULAR SYMPTOMS, DURATION LESS THAN 6 WEEKS: Primary | ICD-10-CM

## 2023-05-20 PROCEDURE — 99284 EMERGENCY DEPT VISIT MOD MDM: CPT | Performed by: FAMILY MEDICINE

## 2023-05-20 PROCEDURE — 6360000002 HC RX W HCPCS: Performed by: FAMILY MEDICINE

## 2023-05-20 PROCEDURE — 96372 THER/PROPH/DIAG INJ SC/IM: CPT | Performed by: FAMILY MEDICINE

## 2023-05-20 PROCEDURE — 72110 X-RAY EXAM L-2 SPINE 4/>VWS: CPT

## 2023-05-20 RX ORDER — KETOROLAC TROMETHAMINE 30 MG/ML
60 INJECTION, SOLUTION INTRAMUSCULAR; INTRAVENOUS ONCE
Status: COMPLETED | OUTPATIENT
Start: 2023-05-20 | End: 2023-05-20

## 2023-05-20 RX ORDER — IBUPROFEN 800 MG/1
800 TABLET ORAL EVERY 6 HOURS PRN
COMMUNITY

## 2023-05-20 RX ORDER — DEXAMETHASONE SODIUM PHOSPHATE 10 MG/ML
10 INJECTION, SOLUTION INTRAMUSCULAR; INTRAVENOUS ONCE
Status: COMPLETED | OUTPATIENT
Start: 2023-05-20 | End: 2023-05-20

## 2023-05-20 RX ORDER — HYDROCODONE BITARTRATE AND ACETAMINOPHEN 5; 325 MG/1; MG/1
1 TABLET ORAL EVERY 6 HOURS PRN
Qty: 12 TABLET | Refills: 0 | Status: SHIPPED | OUTPATIENT
Start: 2023-05-20 | End: 2023-05-23

## 2023-05-20 RX ORDER — PREDNISONE 20 MG/1
TABLET ORAL
Qty: 26 TABLET | Refills: 0 | Status: SHIPPED | OUTPATIENT
Start: 2023-05-20

## 2023-05-20 RX ORDER — ACETAMINOPHEN 500 MG
500 TABLET ORAL EVERY 6 HOURS PRN
COMMUNITY

## 2023-05-20 RX ADMIN — KETOROLAC TROMETHAMINE 60 MG: 30 INJECTION, SOLUTION INTRAMUSCULAR at 14:35

## 2023-05-20 RX ADMIN — DEXAMETHASONE SODIUM PHOSPHATE 10 MG: 10 INJECTION, SOLUTION INTRAMUSCULAR; INTRAVENOUS at 14:36

## 2023-05-20 ASSESSMENT — PAIN DESCRIPTION - DESCRIPTORS
DESCRIPTORS: ACHING
DESCRIPTORS: ACHING;SHARP

## 2023-05-20 ASSESSMENT — PAIN DESCRIPTION - PAIN TYPE: TYPE: ACUTE PAIN

## 2023-05-20 ASSESSMENT — PAIN DESCRIPTION - ORIENTATION
ORIENTATION: RIGHT
ORIENTATION: LOWER

## 2023-05-20 ASSESSMENT — PAIN - FUNCTIONAL ASSESSMENT: PAIN_FUNCTIONAL_ASSESSMENT: 0-10

## 2023-05-20 ASSESSMENT — PAIN DESCRIPTION - LOCATION
LOCATION: BACK
LOCATION: BACK;LEG

## 2023-05-20 ASSESSMENT — PAIN SCALES - GENERAL
PAINLEVEL_OUTOF10: 6
PAINLEVEL_OUTOF10: 8

## 2023-05-20 ASSESSMENT — LIFESTYLE VARIABLES: HOW OFTEN DO YOU HAVE A DRINK CONTAINING ALCOHOL: NEVER

## 2023-05-21 ASSESSMENT — ENCOUNTER SYMPTOMS: BACK PAIN: 1

## 2023-06-06 ENCOUNTER — HOSPITAL ENCOUNTER (EMERGENCY)
Age: 48
Discharge: HOME | End: 2023-06-06
Attending: EMERGENCY MEDICINE
Payer: COMMERCIAL

## 2023-06-06 ENCOUNTER — OFFICE VISIT (OUTPATIENT)
Dept: FAMILY MEDICINE CLINIC | Age: 48
End: 2023-06-06
Payer: COMMERCIAL

## 2023-06-06 VITALS
RESPIRATION RATE: 18 BRPM | WEIGHT: 155 LBS | SYSTOLIC BLOOD PRESSURE: 144 MMHG | OXYGEN SATURATION: 91 % | HEART RATE: 88 BPM | TEMPERATURE: 98.5 F | HEIGHT: 63 IN | BODY MASS INDEX: 27.46 KG/M2 | DIASTOLIC BLOOD PRESSURE: 89 MMHG

## 2023-06-06 VITALS
SYSTOLIC BLOOD PRESSURE: 156 MMHG | OXYGEN SATURATION: 98 % | HEART RATE: 70 BPM | TEMPERATURE: 97.9 F | BODY MASS INDEX: 26.93 KG/M2 | DIASTOLIC BLOOD PRESSURE: 92 MMHG | WEIGHT: 152 LBS

## 2023-06-06 DIAGNOSIS — Z12.31 BREAST CANCER SCREENING BY MAMMOGRAM: ICD-10-CM

## 2023-06-06 DIAGNOSIS — K21.9 GASTROESOPHAGEAL REFLUX DISEASE WITHOUT ESOPHAGITIS: ICD-10-CM

## 2023-06-06 DIAGNOSIS — F41.1 GENERALIZED ANXIETY DISORDER: ICD-10-CM

## 2023-06-06 DIAGNOSIS — M54.41 ACUTE RIGHT-SIDED LOW BACK PAIN WITH RIGHT-SIDED SCIATICA: Primary | ICD-10-CM

## 2023-06-06 PROCEDURE — 96372 THER/PROPH/DIAG INJ SC/IM: CPT | Performed by: NURSE PRACTITIONER

## 2023-06-06 PROCEDURE — 3079F DIAST BP 80-89 MM HG: CPT | Performed by: NURSE PRACTITIONER

## 2023-06-06 PROCEDURE — 96372 THER/PROPH/DIAG INJ SC/IM: CPT

## 2023-06-06 PROCEDURE — 99213 OFFICE O/P EST LOW 20 MIN: CPT | Performed by: NURSE PRACTITIONER

## 2023-06-06 PROCEDURE — 3077F SYST BP >= 140 MM HG: CPT | Performed by: NURSE PRACTITIONER

## 2023-06-06 PROCEDURE — 6360000002 HC RX W HCPCS: Performed by: EMERGENCY MEDICINE

## 2023-06-06 PROCEDURE — 99284 EMERGENCY DEPT VISIT MOD MDM: CPT

## 2023-06-06 RX ORDER — ORPHENADRINE CITRATE 30 MG/ML
60 INJECTION INTRAMUSCULAR; INTRAVENOUS ONCE
Status: COMPLETED | OUTPATIENT
Start: 2023-06-06 | End: 2023-06-06

## 2023-06-06 RX ORDER — PREDNISONE 20 MG/1
40 TABLET ORAL DAILY
Qty: 10 TABLET | Refills: 0 | Status: SHIPPED | OUTPATIENT
Start: 2023-06-06 | End: 2023-06-11

## 2023-06-06 RX ORDER — PANTOPRAZOLE SODIUM 40 MG/1
40 TABLET, DELAYED RELEASE ORAL DAILY
Qty: 90 TABLET | Refills: 1 | Status: SHIPPED | OUTPATIENT
Start: 2023-06-06

## 2023-06-06 RX ORDER — NALBUPHINE HYDROCHLORIDE 10 MG/ML
10 INJECTION, SOLUTION INTRAMUSCULAR; INTRAVENOUS; SUBCUTANEOUS ONCE
Status: COMPLETED | OUTPATIENT
Start: 2023-06-06 | End: 2023-06-06

## 2023-06-06 RX ORDER — KETOROLAC TROMETHAMINE 30 MG/ML
30 INJECTION, SOLUTION INTRAMUSCULAR; INTRAVENOUS ONCE
Status: COMPLETED | OUTPATIENT
Start: 2023-06-06 | End: 2023-06-06

## 2023-06-06 RX ORDER — HYDROCODONE BITARTRATE AND ACETAMINOPHEN 5; 325 MG/1; MG/1
1 TABLET ORAL EVERY 4 HOURS PRN
Qty: 42 TABLET | Refills: 0 | Status: SHIPPED | OUTPATIENT
Start: 2023-06-06 | End: 2023-06-13

## 2023-06-06 RX ADMIN — ORPHENADRINE CITRATE 60 MG: 60 INJECTION INTRAMUSCULAR; INTRAVENOUS at 04:32

## 2023-06-06 RX ADMIN — KETOROLAC TROMETHAMINE 30 MG: 30 INJECTION, SOLUTION INTRAMUSCULAR; INTRAVENOUS at 12:42

## 2023-06-06 RX ADMIN — NALBUPHINE HYDROCHLORIDE 10 MG: 10 INJECTION, SOLUTION INTRAMUSCULAR; INTRAVENOUS; SUBCUTANEOUS at 04:35

## 2023-06-06 SDOH — ECONOMIC STABILITY: INCOME INSECURITY: HOW HARD IS IT FOR YOU TO PAY FOR THE VERY BASICS LIKE FOOD, HOUSING, MEDICAL CARE, AND HEATING?: NOT HARD AT ALL

## 2023-06-06 SDOH — ECONOMIC STABILITY: FOOD INSECURITY: WITHIN THE PAST 12 MONTHS, THE FOOD YOU BOUGHT JUST DIDN'T LAST AND YOU DIDN'T HAVE MONEY TO GET MORE.: NEVER TRUE

## 2023-06-06 SDOH — ECONOMIC STABILITY: FOOD INSECURITY: WITHIN THE PAST 12 MONTHS, YOU WORRIED THAT YOUR FOOD WOULD RUN OUT BEFORE YOU GOT MONEY TO BUY MORE.: NEVER TRUE

## 2023-06-06 SDOH — ECONOMIC STABILITY: HOUSING INSECURITY
IN THE LAST 12 MONTHS, WAS THERE A TIME WHEN YOU DID NOT HAVE A STEADY PLACE TO SLEEP OR SLEPT IN A SHELTER (INCLUDING NOW)?: NO

## 2023-06-06 ASSESSMENT — PAIN SCALES - GENERAL
PAINLEVEL_OUTOF10: 0
PAINLEVEL_OUTOF10: 9
PAINLEVEL_OUTOF10: 7

## 2023-06-06 ASSESSMENT — PATIENT HEALTH QUESTIONNAIRE - PHQ9
SUM OF ALL RESPONSES TO PHQ9 QUESTIONS 1 & 2: 0
1. LITTLE INTEREST OR PLEASURE IN DOING THINGS: 0
SUM OF ALL RESPONSES TO PHQ QUESTIONS 1-9: 0
2. FEELING DOWN, DEPRESSED OR HOPELESS: 0

## 2023-06-06 ASSESSMENT — ENCOUNTER SYMPTOMS
COUGH: 0
DIARRHEA: 0
SHORTNESS OF BREATH: 0
GLOBUS SENSATION: 0
NAUSEA: 0
SORE THROAT: 0
BOWEL INCONTINENCE: 0
HEARTBURN: 0
VOMITING: 0
BACK PAIN: 1

## 2023-06-06 ASSESSMENT — PAIN DESCRIPTION - DESCRIPTORS
DESCRIPTORS: ACHING;SPASM
DESCRIPTORS: ACHING;SPASM

## 2023-06-06 ASSESSMENT — PAIN DESCRIPTION - ORIENTATION
ORIENTATION: RIGHT
ORIENTATION: RIGHT

## 2023-06-06 ASSESSMENT — PAIN DESCRIPTION - LOCATION
LOCATION: BACK;HIP
LOCATION: BACK

## 2023-06-06 ASSESSMENT — LIFESTYLE VARIABLES
HOW OFTEN DO YOU HAVE A DRINK CONTAINING ALCOHOL: NEVER
HOW MANY STANDARD DRINKS CONTAINING ALCOHOL DO YOU HAVE ON A TYPICAL DAY: PATIENT DOES NOT DRINK

## 2023-06-06 ASSESSMENT — PAIN - FUNCTIONAL ASSESSMENT
PAIN_FUNCTIONAL_ASSESSMENT: ACTIVITIES ARE NOT PREVENTED
PAIN_FUNCTIONAL_ASSESSMENT: 0-10

## 2023-06-06 ASSESSMENT — PAIN DESCRIPTION - PAIN TYPE: TYPE: ACUTE PAIN

## 2023-06-06 ASSESSMENT — PAIN DESCRIPTION - FREQUENCY: FREQUENCY: INTERMITTENT

## 2023-06-06 NOTE — PATIENT INSTRUCTIONS
SURVEY:    You may be receiving a survey from [employer] regarding your visit today. Please complete the survey to enable us to provide the highest quality of care to you and your family. If you cannot score us a very good (5 Stars) on any question, please call the office to discuss how we could have made your experience a very good one. Thank you.     Clinical Care Team: PEE Long LPN    Clerical Team: Vasu Beverly

## 2023-06-06 NOTE — PROGRESS NOTES
After obtaining consent, and per orders of [facility], [facility], APRN-CNP, injection of toradol given in Left upper quad. gluteus by Brooklynn Pastor LPN. Patient instructed to remain in clinic for 20 minutes afterwards, and to report any adverse reaction to me immediately.
experience a very good one. Thank you. Clinical Care Team: Regulo Carroll, APRN-JEREMIE Daugherty LPN    Clerical Team: Domenico Colon   Electronically signed by Jennyfer Garcia  on 6/6/2023 at 12:34 PM           Completed Refills   Requested Prescriptions     Signed Prescriptions Disp Refills    pantoprazole (PROTONIX) 40 MG tablet 90 tablet 1     Sig: Take 1 tablet by mouth daily TAKE 1 TABLET BY MOUTH EVERY DAY IN THE MORNING on an empty stomach    sertraline (ZOLOFT) 50 MG tablet 90 tablet 1     Sig: Take 1 tablet by mouth daily    predniSONE (DELTASONE) 20 MG tablet 10 tablet 0     Sig: Take 2 tablets by mouth daily for 5 days    HYDROcodone-acetaminophen (NORCO) 5-325 MG per tablet 42 tablet 0     Sig: Take 1 tablet by mouth every 4 hours as needed for Pain for up to 7 days. Intended supply: 5 days.  Take lowest dose possible to manage pain Max Daily Amount: 6 tablets

## 2023-06-06 NOTE — ED PROVIDER NOTES
rubs   GI:  Soft, nondistended, nontender, no organomegaly, no mass, no rebound, no guarding   :  No costovertebral angle tenderness   Musculoskeletal:  No edema, no tenderness, no deformities. Back-lumbar pain with radiation to the right leg. Integument:  Well hydrated   Neurologic: No focal deficits. Patient has radiating pain from mild lower back to the right posterior leg. No saddle anesthesia. EKG        RADIOLOGY/PROCEDURES    No orders to display       Labs  Labs Reviewed - No data to display    Procedures      EMERGENCY DEPARTMENT COURSE and DIFFERENTIAL DIAGNOSIS/MDM:    Patient Course: She is given Nubain and Norflex IM in ED. Patient feels better. Patient will be discharged home. Continue physical therapy. Follow-up with primary care provider. Low back exercises were recommended. Apply heat to the lower back. The warning signs were discussed. Return to ED if worse. ED Medications administered this visit:    Medications   nalbuphine (NUBAIN) injection 10 mg (10 mg IntraMUSCular Given 6/6/23 0435)   orphenadrine (NORFLEX) injection 60 mg (60 mg IntraMUSCular Given 6/6/23 0432)       New Prescriptions from this visit:    Discharge Medication List as of 6/6/2023  6:56 AM          Follow-up:  Maryla Leyden, APRN - CNP  [address]  [phone]      As needed, If symptoms worsen        Final Impression:   1.  Acute right-sided low back pain with right-sided sciatica                   (Please note that portions of this note were completed with a voice recognition program.  Efforts were made to edit the dictations but occasionally words are mis-transcribed.) Harshil Love MD  06/06/23 [id number]

## 2023-06-07 ENCOUNTER — TELEPHONE (OUTPATIENT)
Dept: FAMILY MEDICINE CLINIC | Age: 48
End: 2023-06-07

## 2023-06-07 NOTE — TELEPHONE ENCOUNTER
Patient asking if she could get a work slip for Wednesday - feels a little better but still having some pain    Health Maintenance   Topic Date Due    Pneumococcal 0-64 years Vaccine (1 - PCV) Never done    HIV screen  Never done    Hepatitis C screen  Never done    DTaP/Tdap/Td vaccine (1 - Tdap) Never done    Diabetes screen  02/24/2019    Cervical cancer screen  11/20/2020    COVID-19 Vaccine (3 - Booster for Pfizer series) 08/04/2021    Breast cancer screen  02/24/2023    Shingles vaccine (1 of 2) Never done    Low dose CT lung screening &/or counseling  Never done    Flu vaccine (Season Ended) 08/01/2023    Lipids  01/03/2024    Depression Screen  06/06/2024    Colorectal Cancer Screen  08/16/2028    Hepatitis A vaccine  Aged Out    Hib vaccine  Aged Out    Meningococcal (ACWY) vaccine  Aged Out    Hepatitis B vaccine  Discontinued             (applicable per patient's age: Cancer Screenings, Depression Screening, Fall Risk Screening, Immunizations)    Hemoglobin A1C (%)   Date Value   02/24/2016 5.6     LDL Cholesterol (mg/dL)   Date Value   01/03/2019 197 (H)     LDL Calculated (mg/dL)   Date Value   05/17/2018 205 (A)     AST (U/L)   Date Value   08/09/2018 18     ALT (U/L)   Date Value   08/09/2018 20     BUN (mg/dL)   Date Value   08/09/2018 13      (goal A1C is < 7)   (goal LDL is <100) need 30-50% reduction from baseline     BP Readings from Last 3 Encounters:   06/06/23 (!) 156/92   06/06/23 (!) 144/89   05/20/23 (!) 175/108    (goal /80)      All Future Testing planned in [facility]:  Lab Frequency Next Occurrence   DERRELL ELVIRA DIGITAL SCREEN BILATERAL Once 06/06/2023       Next Visit Date:  Future Appointments   Date Time Provider Sal Waterman   6/15/2023  2:30 PM Claudia Talbert PT [facility] JESSICA Rosa            Patient Active Problem List:     History of bleeding ulcers     Gastroesophageal reflux disease without esophagitis     Generalized anxiety disorder     Essential hypertension     Other

## 2023-06-21 ENCOUNTER — HOSPITAL ENCOUNTER (OUTPATIENT)
Dept: PHYSICAL THERAPY | Age: 48
Setting detail: THERAPIES SERIES
End: 2023-06-21
Payer: COMMERCIAL

## 2023-06-28 ENCOUNTER — HOSPITAL ENCOUNTER (OUTPATIENT)
Dept: PHYSICAL THERAPY | Age: 48
Setting detail: THERAPIES SERIES
Discharge: HOME | End: 2023-06-28
Payer: COMMERCIAL

## 2023-06-28 PROCEDURE — 97162 PT EVAL MOD COMPLEX 30 MIN: CPT

## 2023-06-28 PROCEDURE — 97110 THERAPEUTIC EXERCISES: CPT

## 2023-06-28 ASSESSMENT — PAIN DESCRIPTION - ORIENTATION: ORIENTATION: RIGHT

## 2023-06-28 ASSESSMENT — PAIN SCALES - GENERAL: PAINLEVEL_OUTOF10: 5

## 2023-06-28 ASSESSMENT — PAIN DESCRIPTION - LOCATION: LOCATION: BACK;LEG;BUTTOCKS

## 2023-06-30 ENCOUNTER — HOSPITAL ENCOUNTER (OUTPATIENT)
Dept: PHYSICAL THERAPY | Age: 48
Setting detail: THERAPIES SERIES
Discharge: HOME | End: 2023-06-30
Payer: COMMERCIAL

## 2023-06-30 PROCEDURE — 97110 THERAPEUTIC EXERCISES: CPT

## 2023-06-30 PROCEDURE — 97140 MANUAL THERAPY 1/> REGIONS: CPT

## 2023-06-30 ASSESSMENT — PAIN DESCRIPTION - ORIENTATION: ORIENTATION: RIGHT

## 2023-06-30 ASSESSMENT — PAIN SCALES - GENERAL: PAINLEVEL_OUTOF10: 2

## 2023-06-30 ASSESSMENT — PAIN DESCRIPTION - LOCATION: LOCATION: BACK;BUTTOCKS

## 2023-07-05 ENCOUNTER — HOSPITAL ENCOUNTER (OUTPATIENT)
Dept: PHYSICAL THERAPY | Age: 48
Setting detail: THERAPIES SERIES
Discharge: HOME | End: 2023-07-05

## 2023-07-05 NOTE — PROGRESS NOTES
[facility]    Date: 2023  Patient Name: Elvia Wei        : 1975       Patient no showed for her appointment. Called and left message regarding next appointment and to call if she was unable to keep it.       Pete Krueger Date: 2023

## 2023-07-07 ENCOUNTER — HOSPITAL ENCOUNTER (OUTPATIENT)
Dept: PHYSICAL THERAPY | Age: 48
Setting detail: THERAPIES SERIES
Discharge: HOME | End: 2023-07-07

## 2023-08-01 NOTE — TELEPHONE ENCOUNTER
Last OV: 6/6/2023   chronic   Last RX:    Next scheduled apt: Visit date not found           Surescript requesting a refill

## 2023-08-02 RX ORDER — TIZANIDINE 4 MG/1
TABLET ORAL
Qty: 90 TABLET | Refills: 2 | Status: SHIPPED | OUTPATIENT
Start: 2023-08-02

## 2023-11-17 NOTE — TELEPHONE ENCOUNTER
----- Message from Eloy Aram sent at 11/17/2023  1:26 PM EST -----  Subject: Refill Request    QUESTIONS  Name of Medication? sertraline (ZOLOFT) 50 MG tablet  Patient-reported dosage and instructions? 50 MG daily   How many days do you have left? 0  Preferred Pharmacy? [id number]  Pharmacy phone number (if available)? [phone]  Additional Information for Provider? Patient has been taking 100 MG for   the past month. Please refill to get to her next appt 11/20.   ---------------------------------------------------------------------------  --------------  CALL BACK INFO  What is the best way for the office to contact you? OK to leave message on   voicemail  Preferred Call Back Phone Number? [phone]  ---------------------------------------------------------------------------  --------------  SCRIPT ANSWERS  Relationship to Patient?  Self

## 2023-11-17 NOTE — TELEPHONE ENCOUNTER
Last OV 6/6/23 for back pain, GERD, anxiety  Requesting refill on sertraline  Rx pending  Next OV 11/20/23

## 2023-11-20 ENCOUNTER — OFFICE VISIT (OUTPATIENT)
Dept: FAMILY MEDICINE CLINIC | Age: 48
End: 2023-11-20
Payer: COMMERCIAL

## 2023-11-20 VITALS
WEIGHT: 150 LBS | BODY MASS INDEX: 26.57 KG/M2 | DIASTOLIC BLOOD PRESSURE: 92 MMHG | SYSTOLIC BLOOD PRESSURE: 154 MMHG | TEMPERATURE: 97.9 F | OXYGEN SATURATION: 98 % | HEART RATE: 84 BPM

## 2023-11-20 DIAGNOSIS — F41.1 GENERALIZED ANXIETY DISORDER: ICD-10-CM

## 2023-11-20 DIAGNOSIS — M15.9 GENERALIZED OSTEOARTHRITIS: ICD-10-CM

## 2023-11-20 DIAGNOSIS — K21.9 GASTROESOPHAGEAL REFLUX DISEASE WITHOUT ESOPHAGITIS: ICD-10-CM

## 2023-11-20 DIAGNOSIS — I10 ESSENTIAL HYPERTENSION: Primary | ICD-10-CM

## 2023-11-20 PROCEDURE — 3080F DIAST BP >= 90 MM HG: CPT | Performed by: NURSE PRACTITIONER

## 2023-11-20 PROCEDURE — 99214 OFFICE O/P EST MOD 30 MIN: CPT | Performed by: NURSE PRACTITIONER

## 2023-11-20 PROCEDURE — 3077F SYST BP >= 140 MM HG: CPT | Performed by: NURSE PRACTITIONER

## 2023-11-20 RX ORDER — MELOXICAM 15 MG/1
15 TABLET ORAL DAILY
Qty: 90 TABLET | Refills: 0 | Status: SHIPPED | OUTPATIENT
Start: 2023-11-20

## 2023-11-20 RX ORDER — PANTOPRAZOLE SODIUM 40 MG/1
40 TABLET, DELAYED RELEASE ORAL DAILY
Qty: 90 TABLET | Refills: 1 | Status: SHIPPED | OUTPATIENT
Start: 2023-11-20

## 2023-11-20 RX ORDER — SERTRALINE HYDROCHLORIDE 100 MG/1
150 TABLET, FILM COATED ORAL DAILY
Qty: 45 TABLET | Refills: 2 | Status: SHIPPED | OUTPATIENT
Start: 2023-11-20

## 2023-11-20 ASSESSMENT — ENCOUNTER SYMPTOMS
SHORTNESS OF BREATH: 0
BACK PAIN: 1
BOWEL INCONTINENCE: 0

## 2023-11-20 NOTE — PATIENT INSTRUCTIONS
SURVEY:    You may be receiving a survey from [employer] regarding your visit today. Please complete the survey to enable us to provide the highest quality of care to you and your family. If you cannot score us a very good (5 Stars) on any question, please call the office to discuss how we could have made your experience a very good one. Thank you.     Clinical Care Team: IRVING Phillips-JEREMIE Keating LPN    Clerical Team: 1 Griffin Gonzalez

## 2023-11-20 NOTE — PROGRESS NOTES
meloxicam (MOBIC) 15 MG tablet 90 tablet 0     Sig: Take 1 tablet by mouth daily    pantoprazole (PROTONIX) 40 MG tablet 90 tablet 1     Sig: Take 1 tablet by mouth daily TAKE 1 TABLET BY MOUTH EVERY DAY IN THE MORNING on an empty stomach     Return in about 4 weeks (around 12/18/2023). Orders Placed This Encounter   Medications    sertraline (ZOLOFT) 100 MG tablet     Sig: Take 1.5 tablets by mouth daily     Dispense:  45 tablet     Refill:  2    meloxicam (MOBIC) 15 MG tablet     Sig: Take 1 tablet by mouth daily     Dispense:  90 tablet     Refill:  0    pantoprazole (PROTONIX) 40 MG tablet     Sig: Take 1 tablet by mouth daily TAKE 1 TABLET BY MOUTH EVERY DAY IN THE MORNING on an empty stomach     Dispense:  90 tablet     Refill:  1     We are requesting this refill to have on file for next month s order. No orders of the defined types were placed in this encounter. Patient Instructions   SURVEY:    You may be receiving a survey from [employer] regarding your visit today. Please complete the survey to enable us to provide the highest quality of care to you and your family. If you cannot score us a very good (5 Stars) on any question, please call the office to discuss how we could have made your experience a very good one. Thank you. Clinical Care Team: IRVING Wolf-JEREMIE Banks LPN    Clerical Team: Pattie iSlva     Electronically signed by Canelo Stockton  on 11/20/2023 at 5:14 PM           Completed Refills   Requested Prescriptions     Signed Prescriptions Disp Refills    sertraline (ZOLOFT) 100 MG tablet 45 tablet 2     Sig: Take 1.5 tablets by mouth daily    meloxicam (MOBIC) 15 MG tablet 90 tablet 0     Sig: Take 1 tablet by mouth daily    pantoprazole (PROTONIX) 40 MG tablet 90 tablet 1     Sig: Take 1 tablet by mouth daily TAKE 1 TABLET BY

## 2024-05-15 RX ORDER — TIZANIDINE 4 MG/1
TABLET ORAL
Qty: 90 TABLET | Refills: 2 | Status: SHIPPED | OUTPATIENT
Start: 2024-05-15

## 2024-05-15 NOTE — TELEPHONE ENCOUNTER
Last OV: 11/20/2023 06/06/23 back pain   Last RX:    Next scheduled apt: Visit date not found            Surescript requesting a refill

## 2024-08-12 ENCOUNTER — OFFICE VISIT (OUTPATIENT)
Dept: FAMILY MEDICINE CLINIC | Age: 49
End: 2024-08-12
Payer: COMMERCIAL

## 2024-08-12 VITALS
HEART RATE: 97 BPM | OXYGEN SATURATION: 97 % | DIASTOLIC BLOOD PRESSURE: 100 MMHG | BODY MASS INDEX: 26.57 KG/M2 | HEIGHT: 63 IN | SYSTOLIC BLOOD PRESSURE: 136 MMHG | TEMPERATURE: 98 F

## 2024-08-12 DIAGNOSIS — F41.1 GAD (GENERALIZED ANXIETY DISORDER): ICD-10-CM

## 2024-08-12 DIAGNOSIS — K21.9 GASTROESOPHAGEAL REFLUX DISEASE WITHOUT ESOPHAGITIS: ICD-10-CM

## 2024-08-12 DIAGNOSIS — G89.29 CHRONIC MIDLINE POSTERIOR NECK PAIN: ICD-10-CM

## 2024-08-12 DIAGNOSIS — M54.2 CHRONIC MIDLINE POSTERIOR NECK PAIN: ICD-10-CM

## 2024-08-12 DIAGNOSIS — H66.002 NON-RECURRENT ACUTE SUPPURATIVE OTITIS MEDIA OF LEFT EAR WITHOUT SPONTANEOUS RUPTURE OF TYMPANIC MEMBRANE: Primary | ICD-10-CM

## 2024-08-12 PROCEDURE — 3075F SYST BP GE 130 - 139MM HG: CPT | Performed by: NURSE PRACTITIONER

## 2024-08-12 PROCEDURE — 3080F DIAST BP >= 90 MM HG: CPT | Performed by: NURSE PRACTITIONER

## 2024-08-12 PROCEDURE — 99214 OFFICE O/P EST MOD 30 MIN: CPT | Performed by: NURSE PRACTITIONER

## 2024-08-12 RX ORDER — TIZANIDINE 4 MG/1
4 TABLET ORAL EVERY 8 HOURS PRN
Qty: 90 TABLET | Refills: 2 | Status: SHIPPED | OUTPATIENT
Start: 2024-08-12

## 2024-08-12 RX ORDER — AMOXICILLIN AND CLAVULANATE POTASSIUM 875; 125 MG/1; MG/1
1 TABLET, FILM COATED ORAL 2 TIMES DAILY
Qty: 20 TABLET | Refills: 0 | Status: SHIPPED | OUTPATIENT
Start: 2024-08-12 | End: 2024-08-22

## 2024-08-12 RX ORDER — PANTOPRAZOLE SODIUM 40 MG/1
40 TABLET, DELAYED RELEASE ORAL DAILY
Qty: 90 TABLET | Refills: 1 | Status: SHIPPED | OUTPATIENT
Start: 2024-08-12

## 2024-08-12 RX ORDER — SERTRALINE HYDROCHLORIDE 100 MG/1
150 TABLET, FILM COATED ORAL DAILY
Qty: 45 TABLET | Refills: 2 | Status: SHIPPED | OUTPATIENT
Start: 2024-08-12

## 2024-08-12 SDOH — ECONOMIC STABILITY: INCOME INSECURITY: HOW HARD IS IT FOR YOU TO PAY FOR THE VERY BASICS LIKE FOOD, HOUSING, MEDICAL CARE, AND HEATING?: NOT HARD AT ALL

## 2024-08-12 SDOH — ECONOMIC STABILITY: FOOD INSECURITY: WITHIN THE PAST 12 MONTHS, YOU WORRIED THAT YOUR FOOD WOULD RUN OUT BEFORE YOU GOT MONEY TO BUY MORE.: NEVER TRUE

## 2024-08-12 SDOH — ECONOMIC STABILITY: FOOD INSECURITY: WITHIN THE PAST 12 MONTHS, THE FOOD YOU BOUGHT JUST DIDN'T LAST AND YOU DIDN'T HAVE MONEY TO GET MORE.: NEVER TRUE

## 2024-08-12 ASSESSMENT — PATIENT HEALTH QUESTIONNAIRE - PHQ9
SUM OF ALL RESPONSES TO PHQ QUESTIONS 1-9: 1
1. LITTLE INTEREST OR PLEASURE IN DOING THINGS: NOT AT ALL
SUM OF ALL RESPONSES TO PHQ QUESTIONS 1-9: 1
2. FEELING DOWN, DEPRESSED OR HOPELESS: SEVERAL DAYS
SUM OF ALL RESPONSES TO PHQ QUESTIONS 1-9: 1
SUM OF ALL RESPONSES TO PHQ QUESTIONS 1-9: 1
SUM OF ALL RESPONSES TO PHQ9 QUESTIONS 1 & 2: 1

## 2024-08-12 ASSESSMENT — ENCOUNTER SYMPTOMS
SINUS PAIN: 1
HEARTBURN: 0
GLOBUS SENSATION: 0

## 2024-08-12 NOTE — PROGRESS NOTES
HPI Notes    Name: Katharine Campbell  : 1975         Chief Complaint:     Chief Complaint   Patient presents with    URI     Pt had + covid on 24 and had negative test last Tuesday. Pt states since Friday severe congestion, cough, chest congestion, fever/ chills on Saturday.     Ear Fullness     Plugged ears and mild discomfort.       History of Present Illness:        URI   This is a new problem. The current episode started 1 to 4 weeks ago. The problem has been waxing and waning. The maximum temperature recorded prior to her arrival was 100.4 - 100.9 F. The fever has been present for Less than 1 day. Associated symptoms include ear pain and sinus pain. She has tried nothing for the symptoms.   Gastroesophageal Reflux  She reports no globus sensation or no heartburn. This is a chronic problem. The current episode started more than 1 year ago. The problem occurs rarely. The problem has been gradually improving. Nothing aggravates the symptoms. Pertinent negatives include no fatigue. Risk factors include caffeine use and lack of exercise. She has tried a PPI for the symptoms.   Mental Health Problem  The primary symptoms include dysphoric mood. The current episode started more than 1 month ago. This is a chronic problem.   The onset of the illness is precipitated by emotional stress. The degree of incapacity that she is experiencing as a consequence of her illness is mild. Additional symptoms of the illness do not include insomnia, fatigue, agitation or feelings of worthlessness. She does not admit to suicidal ideas. She does not have a plan to attempt suicide. She does not contemplate harming themself. She has not already injured self. She does not contemplate injuring another person. She has not already  injured another person.       Past Medical History:     Past Medical History:   Diagnosis Date    Anxiety     Asthma     Bronchitis     Chronic back pain     Depression     Diverticulosis     Eczema

## 2024-12-04 RX ORDER — SERTRALINE HYDROCHLORIDE 100 MG/1
TABLET, FILM COATED ORAL
Qty: 45 TABLET | Refills: 2 | Status: SHIPPED | OUTPATIENT
Start: 2024-12-04

## 2024-12-04 NOTE — TELEPHONE ENCOUNTER
Rx refill request via [facility]  Sertraline 100mg 1.5 tabs qd  Last OV for NALINI 8/12/24  Next appt none

## 2025-02-03 RX ORDER — PANTOPRAZOLE SODIUM 40 MG/1
TABLET, DELAYED RELEASE ORAL
Qty: 90 TABLET | Refills: 1 | Status: SHIPPED | OUTPATIENT
Start: 2025-02-03

## 2025-05-06 ENCOUNTER — TELEPHONE (OUTPATIENT)
Dept: FAMILY MEDICINE CLINIC | Age: 50
End: 2025-05-06

## 2025-05-16 ENCOUNTER — OFFICE VISIT (OUTPATIENT)
Dept: FAMILY MEDICINE CLINIC | Age: 50
End: 2025-05-16
Payer: COMMERCIAL

## 2025-05-16 VITALS
BODY MASS INDEX: 25.02 KG/M2 | HEART RATE: 75 BPM | SYSTOLIC BLOOD PRESSURE: 140 MMHG | HEIGHT: 63 IN | OXYGEN SATURATION: 98 % | DIASTOLIC BLOOD PRESSURE: 86 MMHG | WEIGHT: 141.2 LBS

## 2025-05-16 DIAGNOSIS — G44.221 CHRONIC TENSION-TYPE HEADACHE, INTRACTABLE: ICD-10-CM

## 2025-05-16 DIAGNOSIS — F41.1 GENERALIZED ANXIETY DISORDER: ICD-10-CM

## 2025-05-16 DIAGNOSIS — G89.29 CHRONIC MIDLINE POSTERIOR NECK PAIN: ICD-10-CM

## 2025-05-16 DIAGNOSIS — K21.9 GASTROESOPHAGEAL REFLUX DISEASE WITHOUT ESOPHAGITIS: ICD-10-CM

## 2025-05-16 DIAGNOSIS — M54.2 CHRONIC MIDLINE POSTERIOR NECK PAIN: ICD-10-CM

## 2025-05-16 DIAGNOSIS — F33.1 MODERATE EPISODE OF RECURRENT MAJOR DEPRESSIVE DISORDER (HCC): Primary | ICD-10-CM

## 2025-05-16 PROCEDURE — 3077F SYST BP >= 140 MM HG: CPT | Performed by: NURSE PRACTITIONER

## 2025-05-16 PROCEDURE — 99214 OFFICE O/P EST MOD 30 MIN: CPT | Performed by: NURSE PRACTITIONER

## 2025-05-16 PROCEDURE — 3079F DIAST BP 80-89 MM HG: CPT | Performed by: NURSE PRACTITIONER

## 2025-05-16 RX ORDER — SUMATRIPTAN 50 MG/1
50 TABLET, FILM COATED ORAL DAILY PRN
Qty: 27 TABLET | Refills: 0 | Status: SHIPPED | OUTPATIENT
Start: 2025-05-16

## 2025-05-16 RX ORDER — ARIPIPRAZOLE 2 MG/1
2 TABLET ORAL DAILY
Qty: 30 TABLET | Refills: 0 | Status: SHIPPED | OUTPATIENT
Start: 2025-05-16

## 2025-05-16 RX ORDER — BACLOFEN 10 MG/1
10 TABLET ORAL 3 TIMES DAILY PRN
Qty: 90 TABLET | Refills: 0 | Status: SHIPPED | OUTPATIENT
Start: 2025-05-16

## 2025-05-16 ASSESSMENT — ENCOUNTER SYMPTOMS
DIARRHEA: 0
NAUSEA: 0
SORE THROAT: 0
HEARTBURN: 0
COUGH: 0
VOMITING: 0
SHORTNESS OF BREATH: 0
GLOBUS SENSATION: 0

## 2025-05-16 ASSESSMENT — PATIENT HEALTH QUESTIONNAIRE - PHQ9
1. LITTLE INTEREST OR PLEASURE IN DOING THINGS: NOT AT ALL
SUM OF ALL RESPONSES TO PHQ QUESTIONS 1-9: 0
2. FEELING DOWN, DEPRESSED OR HOPELESS: NOT AT ALL

## 2025-05-16 NOTE — PROGRESS NOTES
HPI Notes    Name: Katharine Campbell  : 1975         Chief Complaint:     Chief Complaint   Patient presents with    Anxiety and Depression      Pt is taking Zoloft 150mg      Gastroesophageal Reflux     Pt is taking pantoprazole 40mg     Headache     Pt notes that she is having one nearly everyday. At the the base of the skull and come up around the sides of her face. Notes that she is taking ibuprofen and tylenol OTC.       History of Present Illness:        Gastroesophageal Reflux  She reports no chest pain, no coughing, no globus sensation, no heartburn, no nausea or no sore throat. This is a chronic problem. The current episode started more than 1 year ago. The symptoms are aggravated by certain foods. Risk factors include caffeine use, lack of exercise and obesity. She has tried a PPI for the symptoms. The treatment provided moderate relief.   Headache  Headache pattern:  Some headache always there, and the pain level varies  Duration:  More than 3 years  Providers seen:  Primary care provider  Lifetime total:  20+  ADL impact frequency:  More than 3 per week  Do headaches wake patient from sleep?: Yes    Quality:  Pounding and tightness  Location:  Back of head and front/forehead  Escalation timing:  Wakes up with severe pain  Headaches last more than three days?: Yes    Aggravating factors:  Lying down  Abortive medications tried:  Acetaminophen and ibuprofen  Preventative medications tried:  Topiramate  Other preventative medications:  Topiramate was not effective  Alternative treatments tried:  Chiropractic manipulation  Depression  Visit Type: follow-up  Patient presents with the following symptoms: decreased concentration, depressed mood and fatigue.  Patient is not experiencing: palpitations and shortness of breath.  Frequency of symptoms: most days   Severity: interfering with daily activities   Sleep quality: good (\"I sleep a lot\")  Compliance with medications:  %      Onset: 12-13 years of

## 2025-05-16 NOTE — PATIENT INSTRUCTIONS
SURVEY:    You [facility] be receiving a [facility].    You may get this in the mail, through your MyChart or in your email.     Please complete the survey to enable us to provide the highest quality of care to you and your family.      Thank you.    Clinical Care Team:         IRVING Robertson-RUDDY Cui                         Triage:         RUDDY Alvarez             Clerical Team:        Sonia Gomez       [place] [phone]           Billing questions: [phone]           Medical Records Request: [phone]

## 2025-05-19 RX ORDER — PANTOPRAZOLE SODIUM 40 MG/1
40 TABLET, DELAYED RELEASE ORAL DAILY
Qty: 90 TABLET | Refills: 1 | Status: SHIPPED | OUTPATIENT
Start: 2025-05-19 | End: 2025-11-15

## 2025-05-19 RX ORDER — SERTRALINE HYDROCHLORIDE 100 MG/1
100 TABLET, FILM COATED ORAL DAILY
Qty: 30 TABLET | Refills: 2 | Status: SHIPPED | OUTPATIENT
Start: 2025-05-19

## 2025-05-19 NOTE — TELEPHONE ENCOUNTER
Last OV: 5/16/2025    Next scheduled apt: Visit date not found      Patient requesting refill  Medications pending

## 2025-06-09 ENCOUNTER — OFFICE VISIT (OUTPATIENT)
Dept: FAMILY MEDICINE CLINIC | Age: 50
End: 2025-06-09
Payer: COMMERCIAL

## 2025-06-09 VITALS
SYSTOLIC BLOOD PRESSURE: 128 MMHG | BODY MASS INDEX: 24.98 KG/M2 | WEIGHT: 141 LBS | OXYGEN SATURATION: 98 % | DIASTOLIC BLOOD PRESSURE: 80 MMHG | HEIGHT: 63 IN | HEART RATE: 87 BPM

## 2025-06-09 DIAGNOSIS — R09.82 PND (POST-NASAL DRIP): ICD-10-CM

## 2025-06-09 DIAGNOSIS — R10.84 GENERALIZED ABDOMINAL PAIN: ICD-10-CM

## 2025-06-09 DIAGNOSIS — J06.9 VIRAL URI: Primary | ICD-10-CM

## 2025-06-09 DIAGNOSIS — F33.1 MODERATE EPISODE OF RECURRENT MAJOR DEPRESSIVE DISORDER (HCC): ICD-10-CM

## 2025-06-09 PROCEDURE — 3079F DIAST BP 80-89 MM HG: CPT | Performed by: NURSE PRACTITIONER

## 2025-06-09 PROCEDURE — 3074F SYST BP LT 130 MM HG: CPT | Performed by: NURSE PRACTITIONER

## 2025-06-09 PROCEDURE — 99213 OFFICE O/P EST LOW 20 MIN: CPT | Performed by: NURSE PRACTITIONER

## 2025-06-09 RX ORDER — ONDANSETRON 4 MG/1
4 TABLET, FILM COATED ORAL DAILY PRN
Qty: 30 TABLET | Refills: 0 | Status: SHIPPED | OUTPATIENT
Start: 2025-06-09

## 2025-06-09 RX ORDER — ARIPIPRAZOLE 5 MG/1
5 TABLET ORAL DAILY
Qty: 30 TABLET | Refills: 2 | Status: SHIPPED | OUTPATIENT
Start: 2025-06-09

## 2025-06-09 ASSESSMENT — PATIENT HEALTH QUESTIONNAIRE - PHQ9
2. FEELING DOWN, DEPRESSED OR HOPELESS: NOT AT ALL
SUM OF ALL RESPONSES TO PHQ QUESTIONS 1-9: 0
1. LITTLE INTEREST OR PLEASURE IN DOING THINGS: NOT AT ALL
SUM OF ALL RESPONSES TO PHQ QUESTIONS 1-9: 0

## 2025-06-09 ASSESSMENT — ENCOUNTER SYMPTOMS
SHORTNESS OF BREATH: 0
VOMITING: 0
DIARRHEA: 1
COUGH: 0
NAUSEA: 0
ABDOMINAL PAIN: 1

## 2025-06-09 NOTE — PROGRESS NOTES
VISIT,PROCEDURE ONLY N/A 8/14/2018    DILATATION AND CURETTAGE HYSTEROSCOPY NOVASURE ABLATION, & Polypectomy performed by Deshawn Harkins MD at [facility] OR    HI OFFICE/OUTPT VISIT,PROCEDURE ONLY N/A 8/16/2018    COLONOSCOPY, Sigmoid Diverticulosis performed by Douglas Rios MD at [facility] OR    TONSILLECTOMY      UPPER GASTROINTESTINAL ENDOSCOPY      UPPER GASTROINTESTINAL ENDOSCOPY N/A 8/16/2018    EGD BIOPSY, Mild Gastritis performed by Douglas Rios MD at [facility] OR        Medications:       Prior to Admission medications    Medication Sig Start Date End Date Taking? Authorizing Provider   ondansetron (ZOFRAN) 4 MG tablet Take 1 tablet by mouth daily as needed for Nausea or Vomiting 6/9/25  Yes Anupam Ricketts DNP   ARIPiprazole (ABILIFY) 5 MG tablet Take 1 tablet by mouth daily 6/9/25  Yes Anupam Ricketts DNP   pantoprazole (PROTONIX) 40 MG tablet Take 1 tablet by mouth daily 5/19/25 11/15/25 Yes Anupam Ricketts DNP   sertraline (ZOLOFT) 100 MG tablet Take 1 tablet by mouth daily 5/19/25  Yes Anupam Ricketts DNP   SUMAtriptan (IMITREX) 50 MG tablet Take 1 tablet by mouth daily as needed for Migraine 5/16/25  Yes Anupam Ricketts DNP   baclofen (LIORESAL) 10 MG tablet Take 1 tablet by mouth 3 times daily as needed (neck pain) 5/16/25  Yes Anupam Ricketts DNP        Allergies:       Environmental/seasonal    Social History:     Tobacco:    reports that she quit smoking about 7 years ago. Her smoking use included cigarettes. She started smoking about 35 years ago. She has a 28 pack-year smoking history. She has been exposed to tobacco smoke. She has never used smokeless tobacco.  Alcohol:      reports no history of alcohol use.  Drug Use:  reports no history of drug use.    Family History:     Family History   Problem Relation Age of Onset    Cancer Maternal Grandfather         Bladder Cancer    Cancer Paternal Grandfather         Lung Cancer       Review of Systems:         Review of

## 2025-06-09 NOTE — PATIENT INSTRUCTIONS
SURVEY:    You may be receiving a [facility].    You may get this in the mail, through your MyChart or in your email.     Please complete the survey to enable us to provide the highest quality of care to you and your family.      Thank you.    Clinical Care Team:         IRVING Robertson-RUDDY Cui                         Triage:         RUDDY Alvarez             Clerical Team:        Sonia Gomez       [place] [phone]           Billing questions: [phone]           Medical Records Request: [phone]

## 2025-07-03 RX ORDER — BACLOFEN 10 MG/1
TABLET ORAL
Qty: 90 TABLET | Refills: 0 | Status: SHIPPED | OUTPATIENT
Start: 2025-07-03

## 2025-07-03 NOTE — TELEPHONE ENCOUNTER
Last OV: 6/9/2025 URI 05/16/25 neck pain  Last RX:    Next scheduled apt: Visit date not found          Surescript requesting a refill   Medication pending for approval

## 2025-09-03 ENCOUNTER — OFFICE VISIT (OUTPATIENT)
Dept: FAMILY MEDICINE CLINIC | Age: 50
End: 2025-09-03
Payer: COMMERCIAL

## 2025-09-03 VITALS
DIASTOLIC BLOOD PRESSURE: 84 MMHG | HEART RATE: 75 BPM | SYSTOLIC BLOOD PRESSURE: 128 MMHG | WEIGHT: 158 LBS | HEIGHT: 63 IN | OXYGEN SATURATION: 98 % | BODY MASS INDEX: 28 KG/M2
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
## 2025-09-03 DIAGNOSIS — M25.511 ACUTE PAIN OF RIGHT SHOULDER: Primary | ICD-10-CM
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
## 2025-09-03 DIAGNOSIS — S46.001A ROTATOR CUFF INJURY, RIGHT, INITIAL ENCOUNTER: ICD-10-CM

## 2025-09-03 PROCEDURE — 99213 OFFICE O/P EST LOW 20 MIN: CPT | Performed by: STUDENT IN AN ORGANIZED HEALTH CARE EDUCATION/TRAINING PROGRAM

## 2025-09-03 PROCEDURE — 3074F SYST BP LT 130 MM HG: CPT | Performed by: STUDENT IN AN ORGANIZED HEALTH CARE EDUCATION/TRAINING PROGRAM

## 2025-09-03 PROCEDURE — 3079F DIAST BP 80-89 MM HG: CPT | Performed by: STUDENT IN AN ORGANIZED HEALTH CARE EDUCATION/TRAINING PROGRAM

## 2025-09-03 RX ORDER — NAPROXEN 500 MG/1
500 TABLET ORAL 2 TIMES DAILY PRN
Qty: 60 TABLET | Refills: 0 | Status: SHIPPED | OUTPATIENT
Start: 2025-09-03

## 2025-09-03 RX ORDER — HYDROCODONE BITARTRATE AND ACETAMINOPHEN 5; 325 MG/1; MG/1
1 TABLET ORAL EVERY 6 HOURS PRN
Qty: 20 TABLET | Refills: 0 | Status: SHIPPED | OUTPATIENT
Start: 2025-09-03 | End: 2025-09-08

## 2025-09-03 ASSESSMENT — ENCOUNTER SYMPTOMS
BACK PAIN: 0
ABDOMINAL PAIN: 0
RHINORRHEA: 0
NAUSEA: 0
DIARRHEA: 0
COUGH: 0
VOMITING: 0
WHEEZING: 0
SINUS PAIN: 0

## (undated) DEVICE — Z DISCONTINUED NO SUB IDED DEVICE ABLAT ENDOMET UTER SOUNDING ES SURESOUND NOVASURE

## (undated) DEVICE — PAD,NON-ADHERENT,3X8,STERILE,LF,1/PK: Brand: MEDLINE

## (undated) DEVICE — Y-TYPE TUR/BLADDER IRRIGATION SET, REGULATING CLAMP

## (undated) DEVICE — BLOCK BITE AD OPN SZ 48FR MOUTHPC ENDOSCP STURDY W/ FOAM

## (undated) DEVICE — JELLY LUBRICATING 4OZ FLIP TOP TB E Z

## (undated) DEVICE — FORCEPS BX L240CM JAW DIA2.2MM RAD JAW 4 HOT DISP

## (undated) DEVICE — TOWEL,OR,DSP,ST,BLUE,STD,4/PK,20PK/CS: Brand: MEDLINE

## (undated) DEVICE — FORCEP SPEC RETRV BX AD 2 MMX155 CM 5 MM GI OVL CUP W/ NDL

## (undated) DEVICE — SOLUTION IV IRRIG POUR BRL 0.9% SODIUM CHL 2F7124

## (undated) DEVICE — BAG PRSS INFUS 1000ML 2 PRSS SFTY VLV RIG HNGR SLIP EASILY

## (undated) DEVICE — GAUZE,SPONGE,4"X4",16PLY,XRAY,STRL,LF: Brand: MEDLINE

## (undated) DEVICE — Z INACTIVE USE 2660664 SOLUTION IRRIG 3000ML 0.9% SOD CHL USP UROMATIC PLAS CONT

## (undated) DEVICE — MEDI-VAC NON-CONDUCTIVE SUCTION TUBING 6MM X 6.1M (20 FT.) L: Brand: CARDINAL HEALTH

## (undated) DEVICE — PREP PAD BNS: Brand: CONVERTORS

## (undated) DEVICE — 1200CC SUCTION CANISTER WITH HYDROPHOBIC FILTER AND RED LID: Brand: BEMIS

## (undated) DEVICE — ASTOUND STANDARD SURGICAL GOWN, XXL: Brand: CONVERTORS

## (undated) DEVICE — DBD-3000ML,PRESSURE INFUSER W/STOPCOCK: Brand: MEDLINE

## (undated) DEVICE — GOWN,AURORA,NONRNF,XL,30/CS: Brand: MEDLINE

## (undated) DEVICE — REAGENT TEST UREASE RAPD CLOTEST F/

## (undated) DEVICE — 4-PORT MANIFOLD: Brand: NEPTUNE 2

## (undated) DEVICE — GLOVE SURG SZ 8 L12IN FNGR THK94MIL STD WHT LTX FREE

## (undated) DEVICE — DBD-PACK,LITHOTOMY,PK II,AURORA: Brand: MEDLINE

## (undated) DEVICE — Z DISCONTINUED USE 2270995 CATHETER URETH 16FR L16IN RED RUB INTMIT RND HLLW TIP 2 OPP